# Patient Record
Sex: FEMALE | Race: BLACK OR AFRICAN AMERICAN | RURAL
[De-identification: names, ages, dates, MRNs, and addresses within clinical notes are randomized per-mention and may not be internally consistent; named-entity substitution may affect disease eponyms.]

---

## 2018-04-20 ENCOUNTER — OFFICE VISIT (OUTPATIENT)
Dept: FAMILY MEDICINE CLINIC | Age: 33
End: 2018-04-20

## 2018-04-20 VITALS
WEIGHT: 293 LBS | HEART RATE: 76 BPM | HEIGHT: 65 IN | SYSTOLIC BLOOD PRESSURE: 124 MMHG | BODY MASS INDEX: 48.82 KG/M2 | DIASTOLIC BLOOD PRESSURE: 83 MMHG | OXYGEN SATURATION: 99 % | RESPIRATION RATE: 18 BRPM | TEMPERATURE: 98.4 F

## 2018-04-20 DIAGNOSIS — E66.01 MORBID OBESITY (HCC): Primary | ICD-10-CM

## 2018-04-20 DIAGNOSIS — F41.9 ANXIETY: ICD-10-CM

## 2018-04-20 DIAGNOSIS — F32.5 MAJOR DEPRESSIVE DISORDER WITH SINGLE EPISODE, IN FULL REMISSION (HCC): ICD-10-CM

## 2018-04-20 DIAGNOSIS — Z11.3 SCREENING EXAMINATION FOR STD (SEXUALLY TRANSMITTED DISEASE): ICD-10-CM

## 2018-04-20 DIAGNOSIS — Z01.419 WELL WOMAN EXAM WITH ROUTINE GYNECOLOGICAL EXAM: ICD-10-CM

## 2018-04-20 DIAGNOSIS — I10 ESSENTIAL HYPERTENSION WITH GOAL BLOOD PRESSURE LESS THAN 140/90: ICD-10-CM

## 2018-04-20 NOTE — PROGRESS NOTES
[address] [facility],    [address] with [facility] and [employer] [facility] Insurance            HPI     CC: \" I am here for a physical \"     Umesh Rincon is a 26 y.o. female who presents  well woman visit. HTN   Initially,pt states she is compliant with all medication. However, after reviewing medication, HCTZ was last filled 2 years ago. Pt then states she has not taken any blood pressure medication in a year. She denies HA, vision changes, chest pain,abdominal pain,n/v, lower extremity swelling, HUFF. Depression/ Anxiety   Pt reports she no longer has signs and symptoms of depression. She states she had some \" moments of depression years ago\" Denies SI, HI, anhedonia,depressed mood. She states she is a different place in her life. She is dating a new glenis. She is happy. Denies alcohol abuse,tobacco abuse,or illicit drug abuse. BTL 13 years ago. Has normal monthly periods. Patient's last menstrual period was 04/20/2018. Denies family  history of uterine cancer, cervical cx, or breast cancer. Pt denies abnormal vaginal discharge, dyspareunia, dysuria. Pt has normal paps. Last pap was 2015, negative for HPV and cytology. Pt does not exercise. She states she knows she needs to do somthing about her weight. PMHx:  Past Medical History:   Diagnosis Date    Depression 12/12/2014    Essential hypertension with goal blood pressure less than 140/90 5/17/2016    Headache(784.0)     Hx of gynecological procedure 2004    BTL    Keloid 5/17/2016       Meds:   Current Outpatient Prescriptions   Medication Sig Dispense Refill    hydrochlorothiazide (HYDRODIURIL) 25 mg tablet Take 1 Tab by mouth daily.  30 Tab 1       Allergies:   No Known Allergies    Smoker:  History   Smoking Status    Former Smoker    Years: 2.00    Types: Cigarettes    Quit date: 6/1/2016   Smokeless Tobacco    Never Used       ETOH:   History   Alcohol Use No       FH:   Family History   Problem Relation Age of Onset    Asthma Mother     Hypertension Mother     Asthma Sister     Asthma Maternal Grandmother     Arthritis-osteo Paternal Grandmother     Diabetes Paternal Grandmother     Elevated Lipids Paternal Grandmother     Diabetes Other        ROS:  Review of Systems   Constitutional: Negative for activity change, appetite change, chills, fatigue and fever. Respiratory: Negative for chest tightness and shortness of breath. Cardiovascular: Negative for chest pain, palpitations and leg swelling. Gastrointestinal: Negative for abdominal pain, diarrhea, nausea and vomiting. Genitourinary: Negative for dyspareunia, dysuria, flank pain, frequency, hematuria, pelvic pain, urgency, vaginal bleeding, vaginal discharge and vaginal pain. Musculoskeletal: Negative for back pain and joint swelling. Neurological: Negative for dizziness, weakness and numbness. Psychiatric/Behavioral: Negative for confusion. Physical Exam:  Visit Vitals    /83 (BP 1 Location: Left arm, BP Patient Position: Sitting)    Pulse 76    Temp 98.4 °F (36.9 °C) (Oral)    Resp 18    Ht 5' 5\" (1.651 m)    Wt (!) 359 lb 9.6 oz (163.1 kg)    LMP 04/20/2018    SpO2 99%    BMI 59.84 kg/m2       Wt Readings from Last 3 Encounters:   04/20/18 (!) 359 lb 9.6 oz (163.1 kg)   12/15/16 343 lb 12.8 oz (155.9 kg)   12/08/16 339 lb (153.8 kg)     BP Readings from Last 3 Encounters:   04/20/18 124/83   12/15/16 144/89   12/08/16 (!) 147/92        Physical Exam   Constitutional: She is oriented to person, place, and time. She appears well-developed and well-nourished. HENT:   Head: Atraumatic. Eyes: Conjunctivae and EOM are normal.   Neck: Neck supple. Cardiovascular: Normal rate, regular rhythm, normal heart sounds and intact distal pulses. Pulmonary/Chest: Effort normal and breath sounds normal. No respiratory distress.    Abdominal: Bowel sounds are normal.   Genitourinary: Vagina normal. Cervix exhibits no motion tenderness and no friability. Right adnexum displays no mass, no tenderness and no fullness. Left adnexum displays no mass, no tenderness and no fullness. Genitourinary Comments: Physiologic discharge   Musculoskeletal: She exhibits no edema. Neurological: She is alert and oriented to person, place, and time. No cranial nerve deficit. Skin: Skin is warm. No erythema. Psychiatric: She has a normal mood and affect. Assessment     26 y.o. female presents with history of:  Patient Active Problem List   Diagnosis Code    Oj N94.0    Depression F32.9    Anxiety F41.9    Essential hypertension with goal blood pressure less than 140/90 I10    Morbid obesity ([address] E66.01    Keloid L91.0    Major depressive disorder with single episode, in full remission ([address] F32.5       Today's diagnoses are:    ICD-10-CM ICD-9-CM    1. Morbid obesity ([address] E66.01 278.01 LIPID PANEL   2. Well woman exam with routine gynecological exam Z01.419 V72.31 CHLAMYDIA/GC PCR      RPR      HIV 1/2 AG/AB, 4TH GENERATION,W RFLX CONFIRM      HEMOGLOBIN A1C WITH EAG      METABOLIC PANEL, COMPREHENSIVE      TSH 3RD GENERATION      LIPID PANEL   3. Screening examination for STD (sexually transmitted disease) Z11.3 V74.5 CT/NG/T.VAGINALIS AMPLIFICATION   4. Essential hypertension with goal blood pressure less than 140/90 I10 401.9    5. Major depressive disorder with single episode, in full remission ([address] F32.5 296.26    6. Anxiety F41.9 300.00               Plan     ·  Well Woman exam with pelvic exam   Last pap : 4/2015 negative for intraepithelial lesions or malignancy,negative HPV. Pt requested following labs as she is starting a new relationship.    - HIV, Chlamyddia, Gonorrhea, RPR,Nuswab    ·  Morbid Obesity   - I have reviewed/discussed the above normal BMI with the patient.   I have recommended the following interventions: dietary management education, guidance, and counseling, encourage exercise, monitor weight and prescribed dietary intake . Faith Cain - TSH,hemoglobin A1c, lipid panel , CMP     ·  HTN   Pt has not been compliant with bp meds. Today / 83. 2 years ago ( 2016), when patient was last seen bp 140s/80s with one elveated bp 160s/100s in 2015    - Hold HCTZ for now    - BP check in 2 weeks     · Hx of depression    PHQ=0   - Will continue to monitor for symptoms    - Never been treated with medication      Patient Care Team:  Macy Mcguire MD as PCP - General (Family Practice)  Ramandeep Faye MD (Plastic Surgery)    Follow-up Disposition:  Return in about 4 weeks (around 5/18/2018) for bp check . Prior labs and imaging were reviewed. I have discussed the diagnosis with the patient and the intended plan as seen in the above orders. The patient has received an after-visit summary and questions were answered concerning future plans. I have discussed medication side effects and warnings with the patient as well. Patient discussed with Dr. Damon Faustin, Attending Physician.     Bobby Westbrook MD    [address] [place] [address]

## 2018-04-20 NOTE — PATIENT INSTRUCTIONS

## 2018-04-20 NOTE — MR AVS SNAPSHOT
[address] [address] 
[phone] Patient: Latina Osgood MRN: [id number] NFN:[id number] Visit Information Date & Time Provider Department Dept. Phone Encounter #  
 4/20/2018  9:25 AM Rich Julio  [facility] [phone] [id number] Follow-up Instructions Return in about 4 weeks (around 5/18/2018) for bp check . Upcoming Health Maintenance Date Due DTaP/Tdap/Td series (1 - Tdap) 3/3/2004 Influenza Age 5 to Adult 8/1/2017 PAP AKA CERVICAL CYTOLOGY 4/14/2020 Allergies as of 4/20/2018  Review Complete On: 4/20/2018 By: Jaylin Rodriguez LPN No Known Allergies Current Immunizations  Never Reviewed Name Date Influenza Vaccine 10/24/2016 TB Skin Test (PPD) Intradermal 12/12/2014, 11/4/2014 Not reviewed this visit You Were Diagnosed With   
  
 Codes Comments Morbid obesity ([facility]    -  Primary ICD-10-CM: E66.01 
ICD-9-CM: 278.01 Major depressive disorder with single episode, in full remission ([facility]     ICD-10-CM: F32.5 ICD-9-CM: 296.26 Anxiety     ICD-10-CM: F41.9 ICD-9-CM: 300.00 Essential hypertension with goal blood pressure less than 140/90     ICD-10-CM: I10 
ICD-9-CM: 401.9 Well woman exam with routine gynecological exam     ICD-10-CM: M90.433 ICD-9-CM: V72.31 Vitals BP Pulse Temp Resp Height(growth percentile) Weight(growth percentile) 124/83 (BP 1 Location: Left arm, BP Patient Position: Sitting) 76 98.4 °F (36.9 °C) (Oral) 18 5' 5\" (1.651 m) (!) 359 lb 9.6 oz (163.1 kg) LMP SpO2 BMI OB Status Smoking Status 04/20/2018 99% 59.84 kg/m2 Having regular periods Former Smoker BMI and BSA Data Body Mass Index Body Surface Area  
 59.84 kg/m 2 2.73 m 2 Preferred Pharmacy Pharmacy Name Phone [address] [place], [place] - [address] [place] [address] [phone] Your Updated Medication List  
  
   
This list is accurate as of 4/20/18  9:58 AM.  Always use your most recent med list.  
  
  
  
  
 hydroCHLOROthiazide 25 mg tablet Commonly known as:  HYDRODIURIL Take 1 Tab by mouth daily. We Performed the Following CHLAMYDIA/GC PCR [00532 CPT(R)] HEMOGLOBIN A1C WITH EAG [01444 CPT(R)] HIV 1/2 AG/AB, 4TH GENERATION,W RFLX CONFIRM [id number] CPT(R)] METABOLIC PANEL, COMPREHENSIVE [49857 CPT(R)] RPR [59524 CPT(R)] TSH 3RD GENERATION [18477 CPT(R)] Follow-up Instructions Return in about 4 weeks (around 5/18/2018) for bp check . Patient Instructions Well Visit, Ages 25 to 48: Care Instructions Your Care Instructions Physical exams can help you stay healthy. Your doctor has checked your overall health and may have suggested ways to take good care of yourself. He or she also may have recommended tests. At home, you can help prevent illness with healthy eating, regular exercise, and other steps. Follow-up care is a key part of your treatment and safety. Be sure to make and go to all appointments, and call your doctor if you are having problems. It's also a good idea to know your test results and keep a list of the medicines you take. How can you care for yourself at home? · Reach and stay at a healthy weight. This will lower your risk for many problems, such as obesity, diabetes, heart disease, and high blood pressure. · Get at least 30 minutes of physical activity on most days of the week. Walking is a good choice. You also may want to do other activities, such as running, swimming, cycling, or playing tennis or team sports. Discuss any changes in your exercise program with your doctor. · Do not smoke or allow others to smoke around you. If you need help quitting, talk to your doctor about stop-smoking programs and medicines. These can increase your chances of quitting for good. · Talk to your doctor about whether you have any risk factors for sexually transmitted infections (STIs). Having one sex partner (who does not have STIs and does not have sex with anyone else) is a good way to avoid these infections. · Use birth control if you do not want to have children at this time. Talk with your doctor about the choices available and what might be best for you. · Protect your skin from too much sun. When you're outdoors from 10 a.m. to 4 p.m., stay in the shade or cover up with clothing and a hat with a wide brim. Wear sunglasses that block UV rays. Even when it's cloudy, put broad-spectrum sunscreen (SPF 30 or higher) on any exposed skin. · See a dentist one or two times a year for checkups and to have your teeth cleaned. · Wear a seat belt in the car. · Drink alcohol in moderation, if at all. That means no more than 2 drinks a day for men and 1 drink a day for women. Follow your doctor's advice about when to have certain tests. These tests can spot problems early. For everyone · Cholesterol. Have the fat (cholesterol) in your blood tested after age 21. Your doctor will tell you how often to have this done based on your age, family history, or other things that can increase your risk for heart disease. · Blood pressure. Have your blood pressure checked during a routine doctor visit. Your doctor will tell you how often to check your blood pressure based on your age, your blood pressure results, and other factors. · Vision. Talk with your doctor about how often to have a glaucoma test. 
· Diabetes. Ask your doctor whether you should have tests for diabetes. · Colon cancer. Have a test for colon cancer at age 48. You may have one of several tests. If you are younger than 48, you may need a test earlier if you have any risk factors.  Risk factors include whether you already had a precancerous polyp removed from your colon or whether your parent, brother, sister, or child has had colon cancer. For women · Breast exam and mammogram. Talk to your doctor about when you should have a clinical breast exam and a mammogram. Medical experts differ on whether and how often women under 50 should have these tests. Your doctor can help you decide what is right for you. · Pap test and pelvic exam. Begin Pap tests at age 24. A Pap test is the best way to find cervical cancer. The test often is part of a pelvic exam. Ask how often to have this test. 
· Tests for sexually transmitted infections (STIs). Ask whether you should have tests for STIs. You may be at risk if you have sex with more than one person, especially if your partners do not wear condoms. For men · Tests for sexually transmitted infections (STIs). Ask whether you should have tests for STIs. You may be at risk if you have sex with more than one person, especially if you do not wear a condom. · Testicular cancer exam. Ask your doctor whether you should check your testicles regularly. · Prostate exam. Talk to your doctor about whether you should have a blood test (called a PSA test) for prostate cancer. Experts differ on whether and when men should have this test. Some experts suggest it if you are older than 39 and are -American or have a father or brother who got prostate cancer when he was younger than 72. When should you call for help? Watch closely for changes in your health, and be sure to contact your doctor if you have any problems or symptoms that concern you. Where can you learn more? Go to http://beena-quyen.info/. Enter P072 in the search box to learn more about \"Well Visit, Ages 25 to 48: Care Instructions. \" Current as of: May 12, 2017 Content Version: 11.4 © 2795-7234 [employer].  Care instructions adapted under license by [employer] (which disclaims liability or warranty for this information). If you have questions about a medical condition or this instruction, always ask your healthcare professional. [address] any warranty or liability for your use of this information. Introducing [facility]! [facility] introduces Lua patient portal. Now you can access parts of your medical record, email your doctor's office, and request medication refills online. 1. In your internet browser, go to [email or web address] 2. Click on the First Time User? Click Here link in the Sign In box. You will see the New Member Sign Up page. 3. Enter your Lua Access Code exactly as it appears below. You will not need to use this code after youve completed the sign-up process. If you do not sign up before the expiration date, you must request a new code. · Lua Access Code: [email or web address] Expires: 7/19/2018  9:35 AM 
 
4. Enter the last four digits of your Social Security Number (xxxx) and Date of Birth (mm/dd/yyyy) as indicated and click Submit. You will be taken to the next sign-up page. 5. Create a Lua ID. This will be your Lua login ID and cannot be changed, so think of one that is secure and easy to remember. 6. Create a Lua password. You can change your password at any time. 7. Enter your Password Reset Question and Answer. This can be used at a later time if you forget your password. 8. Enter your e-mail address. You will receive e-mail notification when new information is available in [address] 9. Click Sign Up. You can now view and download portions of your medical record. 10. Click the Download Summary menu link to download a portable copy of your medical information. If you have questions, please visit the Frequently Asked Questions section of the Lua website. Remember, Lua is NOT to be used for urgent needs. For medical emergencies, dial 911. Now available from your iPhone and Android! Please provide this summary of care documentation to your next provider. Your primary care clinician is listed as Michael Velasco. If you have any questions after today's visit, please call [phone].

## 2018-04-20 NOTE — PROGRESS NOTES
Chief Complaint   Patient presents with    Well Woman    Complete Physical     1. Have you been to the ER, urgent care clinic since your last visit? Hospitalized since your last visit? No    2. Have you seen or consulted any other health care providers outside of the [facility] since your last visit? Include any pap smears or colon screening.  No

## 2018-04-21 LAB
CHOLEST SERPL-MCNC: 148 MG/DL (ref 100–199)
HDLC SERPL-MCNC: 44 MG/DL
LDLC SERPL CALC-MCNC: 84 MG/DL (ref 0–99)
TRIGL SERPL-MCNC: 100 MG/DL (ref 0–149)
VLDLC SERPL CALC-MCNC: 20 MG/DL (ref 5–40)

## 2018-04-22 LAB
C TRACH RRNA SPEC QL NAA+PROBE: NEGATIVE
N GONORRHOEA RRNA SPEC QL NAA+PROBE: NEGATIVE
T VAGINALIS RRNA SPEC QL NAA+PROBE: NEGATIVE

## 2018-04-23 LAB
ALBUMIN SERPL-MCNC: 3.8 G/DL (ref 3.5–5.5)
ALBUMIN/GLOB SERPL: 1.2 {RATIO} (ref 1.2–2.2)
ALP SERPL-CCNC: 63 IU/L (ref 39–117)
ALT SERPL-CCNC: 22 IU/L (ref 0–32)
AST SERPL-CCNC: 15 IU/L (ref 0–40)
BILIRUB SERPL-MCNC: 0.4 MG/DL (ref 0–1.2)
BUN SERPL-MCNC: 8 MG/DL (ref 6–20)
BUN/CREAT SERPL: 11 (ref 9–23)
C TRACH RRNA SPEC QL NAA+PROBE: NEGATIVE
CALCIUM SERPL-MCNC: 9.2 MG/DL (ref 8.7–10.2)
CHLORIDE SERPL-SCNC: 104 MMOL/L (ref 96–106)
CO2 SERPL-SCNC: 25 MMOL/L (ref 18–29)
CREAT SERPL-MCNC: 0.73 MG/DL (ref 0.57–1)
EST. AVERAGE GLUCOSE BLD GHB EST-MCNC: 103 MG/DL
GFR SERPLBLD CREATININE-BSD FMLA CKD-EPI: 107 ML/MIN/1.73
GFR SERPLBLD CREATININE-BSD FMLA CKD-EPI: 123 ML/MIN/1.73
GLOBULIN SER CALC-MCNC: 3.1 G/DL (ref 1.5–4.5)
GLUCOSE SERPL-MCNC: 87 MG/DL (ref 65–99)
HBA1C MFR BLD: 5.2 % (ref 4.8–5.6)
HIV 1+2 AB+HIV1 P24 AG SERPL QL IA: NON REACTIVE
N GONORRHOEA RRNA SPEC QL NAA+PROBE: NEGATIVE
POTASSIUM SERPL-SCNC: 3.9 MMOL/L (ref 3.5–5.2)
PROT SERPL-MCNC: 6.9 G/DL (ref 6–8.5)
RPR SER QL: NON REACTIVE
SODIUM SERPL-SCNC: 143 MMOL/L (ref 134–144)
TSH SERPL DL<=0.005 MIU/L-ACNC: 1.27 UIU/ML (ref 0.45–4.5)

## 2018-04-27 ENCOUNTER — TELEPHONE (OUTPATIENT)
Dept: FAMILY MEDICINE CLINIC | Age: 33
End: 2018-04-27

## 2018-04-27 NOTE — TELEPHONE ENCOUNTER
[address]     Pt was called and updated on lab results ( HIV neg, RPR neg, Hemoglobin A1c 5.2, CMP wnl, TSH normal, Lipid panel wnl, Nuswab negative.) All lab results were negative. Pt did not have any questions. She will follow up in 4 weeks for bp check.                   Bobby Westbrook MD  Family Medicine Resident  PGY 2

## 2019-03-18 ENCOUNTER — OFFICE VISIT (OUTPATIENT)
Dept: FAMILY MEDICINE CLINIC | Age: 34
End: 2019-03-18

## 2019-03-18 VITALS
RESPIRATION RATE: 20 BRPM | HEART RATE: 117 BPM | OXYGEN SATURATION: 99 % | DIASTOLIC BLOOD PRESSURE: 89 MMHG | SYSTOLIC BLOOD PRESSURE: 127 MMHG | TEMPERATURE: 100.8 F | BODY MASS INDEX: 48.82 KG/M2 | WEIGHT: 293 LBS | HEIGHT: 65 IN

## 2019-03-18 DIAGNOSIS — R50.9 ACUTE FEBRILE ILLNESS: Primary | ICD-10-CM

## 2019-03-18 DIAGNOSIS — E66.01 MORBID OBESITY (HCC): ICD-10-CM

## 2019-03-18 DIAGNOSIS — R10.30 LOWER ABDOMINAL PAIN: ICD-10-CM

## 2019-03-18 LAB
BILIRUB UR QL STRIP: NEGATIVE
GLUCOSE UR-MCNC: NEGATIVE MG/DL
HCG URINE, QL. (POC): NEGATIVE
KETONES P FAST UR STRIP-MCNC: NEGATIVE MG/DL
PH UR STRIP: 7.5 [PH] (ref 4.6–8)
PROT UR QL STRIP: NORMAL
SP GR UR STRIP: 1.01 (ref 1–1.03)
UA UROBILINOGEN AMB POC: NORMAL (ref 0.2–1)
URINALYSIS CLARITY POC: NORMAL
URINALYSIS COLOR POC: YELLOW
URINE BLOOD POC: NORMAL
URINE LEUKOCYTES POC: NORMAL
URINE NITRITES POC: NEGATIVE
VALID INTERNAL CONTROL?: YES

## 2019-03-18 RX ORDER — DICLOFENAC SODIUM 75 MG/1
75 TABLET, DELAYED RELEASE ORAL 2 TIMES DAILY
Qty: 30 TAB | Refills: 0 | Status: SHIPPED | OUTPATIENT
Start: 2019-03-18 | End: 2019-06-13 | Stop reason: SDUPTHER

## 2019-03-18 RX ORDER — CIPROFLOXACIN 500 MG/1
500 TABLET ORAL 2 TIMES DAILY
Qty: 20 TAB | Refills: 0 | Status: SHIPPED | OUTPATIENT
Start: 2019-03-18 | End: 2019-03-28

## 2019-03-18 RX ORDER — HYDROCODONE BITARTRATE AND ACETAMINOPHEN 5; 325 MG/1; MG/1
1 TABLET ORAL
Qty: 12 TAB | Refills: 0 | Status: SHIPPED | OUTPATIENT
Start: 2019-03-18 | End: 2019-03-21

## 2019-03-18 NOTE — PROGRESS NOTES
1. Have you been to the ER, urgent care clinic since your last visit? Hospitalized since your last visit? ER in [place], [place] and diagnosed with the flu    2. Have you seen or consulted any other health care providers outside of the [address] [place] [address] since your last visit? Including any pap smears or colon screening. no    Reviewed record in preparation for visit and have necessary documentation    Pt did not bring medication to office visit for review  Opportunity was given for questions    Goals that were addressed and/or need to be completed during or after this appointment include   Health Maintenance Due   Topic Date Due    DTaP/Tdap/Td series (1 - Tdap) 03/03/2004    Influenza Age 5 to Adult  08/01/2018     Body mass index is 59.57 kg/m².

## 2019-03-19 LAB
ALBUMIN SERPL-MCNC: 3.5 G/DL (ref 3.5–5.5)
ALBUMIN/GLOB SERPL: 1.1 {RATIO} (ref 1.2–2.2)
ALP SERPL-CCNC: 132 IU/L (ref 39–117)
ALT SERPL-CCNC: 30 IU/L (ref 0–32)
AST SERPL-CCNC: 21 IU/L (ref 0–40)
BASOPHILS # BLD AUTO: 0 X10E3/UL (ref 0–0.2)
BASOPHILS NFR BLD AUTO: 0 %
BILIRUB SERPL-MCNC: 0.3 MG/DL (ref 0–1.2)
BUN SERPL-MCNC: 10 MG/DL (ref 6–20)
BUN/CREAT SERPL: 13 (ref 9–23)
CALCIUM SERPL-MCNC: 8.9 MG/DL (ref 8.7–10.2)
CHLORIDE SERPL-SCNC: 100 MMOL/L (ref 96–106)
CO2 SERPL-SCNC: 24 MMOL/L (ref 20–29)
CREAT SERPL-MCNC: 0.79 MG/DL (ref 0.57–1)
EOSINOPHIL # BLD AUTO: 0 X10E3/UL (ref 0–0.4)
EOSINOPHIL NFR BLD AUTO: 0 %
ERYTHROCYTE [DISTWIDTH] IN BLOOD BY AUTOMATED COUNT: 13.1 % (ref 12.3–15.4)
GLOBULIN SER CALC-MCNC: 3.3 G/DL (ref 1.5–4.5)
GLUCOSE SERPL-MCNC: 94 MG/DL (ref 65–99)
HCT VFR BLD AUTO: 34.4 % (ref 34–46.6)
HGB BLD-MCNC: 11.5 G/DL (ref 11.1–15.9)
IMM GRANULOCYTES # BLD AUTO: 0 X10E3/UL (ref 0–0.1)
IMM GRANULOCYTES NFR BLD AUTO: 0 %
LYMPHOCYTES # BLD AUTO: 2 X10E3/UL (ref 0.7–3.1)
LYMPHOCYTES NFR BLD AUTO: 15 %
MCH RBC QN AUTO: 29.4 PG (ref 26.6–33)
MCHC RBC AUTO-ENTMCNC: 33.4 G/DL (ref 31.5–35.7)
MCV RBC AUTO: 88 FL (ref 79–97)
MONOCYTES # BLD AUTO: 1.4 X10E3/UL (ref 0.1–0.9)
MONOCYTES NFR BLD AUTO: 11 %
NEUTROPHILS # BLD AUTO: 9.9 X10E3/UL (ref 1.4–7)
NEUTROPHILS NFR BLD AUTO: 74 %
PLATELET # BLD AUTO: 559 X10E3/UL (ref 150–379)
POTASSIUM SERPL-SCNC: 4.1 MMOL/L (ref 3.5–5.2)
PROT SERPL-MCNC: 6.8 G/DL (ref 6–8.5)
RBC # BLD AUTO: 3.91 X10E6/UL (ref 3.77–5.28)
SODIUM SERPL-SCNC: 142 MMOL/L (ref 134–144)
WBC # BLD AUTO: 13.4 X10E3/UL (ref 3.4–10.8)

## 2019-03-20 ENCOUNTER — TELEPHONE (OUTPATIENT)
Dept: FAMILY MEDICINE CLINIC | Age: 34
End: 2019-03-20

## 2019-03-20 NOTE — TELEPHONE ENCOUNTER
----- Message from Meghna Mcneal MD sent at 3/20/2019 12:48 PM EDT -----  Call and check on patient. Lab results indicate infection. She was prescribed cipro.

## 2019-03-24 NOTE — PROGRESS NOTES
Patient: Santhosh Daley MRN: [id number]  SSN: [id number] YOB: 1985  Age: 37 y.o. Sex: female Subjective: Chief Complaint Patient presents with  Cold Symptoms HPI: she is a 37y.o. year old female new to me who presents for acute febrile illness with lower abdominal pain. Patient denies HA, dizziness, SOB, CP, N/V/D, melena, dysuria, acute myalgias or arthralgias. Patient with hx of HTN and morbid obesity. Encounter Diagnoses Name Primary?  Acute febrile illness Yes  Lower abdominal pain  Morbid obesity ([facility] BP Readings from Last 3 Encounters:  
03/18/19 127/89  
04/20/18 124/83  
12/15/16 144/89 Wt Readings from Last 3 Encounters:  
03/18/19 (!) 358 lb (162.4 kg) 04/20/18 (!) 359 lb 9.6 oz (163.1 kg) 12/15/16 343 lb 12.8 oz (155.9 kg) Body mass index is 59.57 kg/m². Current and past medical information: 
 
Current Medications after this visit[de-identified]    
Current Outpatient Medications Medication Sig  ciprofloxacin HCl (CIPRO) 500 mg tablet Take 1 Tab by mouth two (2) times a day for 10 days.  diclofenac EC (VOLTAREN) 75 mg EC tablet Take 1 Tab by mouth two (2) times a day. Take with food. Drink plenty of water. No current facility-administered medications for this visit. Patient Active Problem List  
 Diagnosis Date Noted  Major depressive disorder with single episode, in full remission ([facility] 04/20/2018  Essential hypertension with goal blood pressure less than 140/90 05/17/2016  Morbid obesity ([facility] 05/17/2016  Keloid 05/17/2016  Depression 12/12/2014  Anxiety 12/12/2014  [place]merz 10/30/2012 Past Medical History:  
Diagnosis Date  Depression 12/12/2014  Essential hypertension with goal blood pressure less than 140/90 5/17/2016  Headache(784.0)  Hx of gynecological procedure 2004 BTL  Keloid 5/17/2016 No Known Allergies Past Surgical History: Procedure Laterality Date  HX GYN Social History Socioeconomic History  Marital status: SINGLE Spouse name: Not on file  Number of children: Not on file  Years of education: Not on file  Highest education level: Not on file Tobacco Use  Smoking status: Former Smoker Years: 2.00 Types: Cigarettes Last attempt to quit: 2016 Years since quittin.8  Smokeless tobacco: Never Used Substance and Sexual Activity  Alcohol use: No  
 Drug use: No  
 Sexual activity: Yes  
  Partners: Male Birth control/protection: Surgical, None Comment: BTL 2004 Objective:  
 
Review of Systems: 
Constitutional: Positive for fever and malaise Derm: Negative for rash or lesion HEENT: Negative for acute hearing or vision changes Cardiovascular: Negative for dizziness, chest pain or palpitations Respiratory: Negative for cough, wheezing or SOB Gastrointestinal: c/o lower abdominal pain Genital/urinary: Negative for dysuria or voiding dysfunction Muscoloskeletal: Negative for acute myalgias or arthralgias Neurological: Negative for headache, weakness or paresthesia Psychological: hx of depression and anxiety Vitals:  
 19 1142 BP: 127/89 Pulse: (!) 117 Resp: 20 Temp: (!) 100.8 °F (38.2 °C) TempSrc: Oral  
SpO2: 99% Weight: (!) 358 lb (162.4 kg) Height: 5' 5\" (1.651 m) Body mass index is 59.57 kg/m². Physical Exam: 
Constitutional: obese, appear uncomfortable Skin: warm and dry, normal tone and turgor Head: normocephalic, atraumatic Eyes: sclera clear, EOMI, PERRL Neck: normal range of motion Cardiovascular: normal S1, S2, tachycardic, regular rhythm Respiratory: clear to auscultation bilaterally with symmetrical effort Abdomen: soft, BS normal, non-distended, diffuse lower abdominal pain Extremities: full range of motion, normal gait Neurology: normal strength and sensation Psych: active, alert and oriented, affect appropriate Assessment and orders: ICD-10-CM ICD-9-CM 1. Acute febrile illness R50.9 780.60 CBC WITH AUTOMATED DIFF  
   ciprofloxacin HCl (CIPRO) 500 mg tablet AMB POC URINE PREGNANCY TEST, VISUAL COLOR COMPARISON 2. Lower abdominal pain R10.30 789.09 AMB POC URINALYSIS DIP STICK AUTO W/O MICRO  
   CBC WITH AUTOMATED DIFF  
   METABOLIC PANEL, COMPREHENSIVE  
   ciprofloxacin HCl (CIPRO) 500 mg tablet XR ABD ACUTE W 1 V CHEST  
   diclofenac EC (VOLTAREN) 75 mg EC tablet HYDROcodone-acetaminophen (NORCO) 5-325 mg per tablet AMB POC URINE PREGNANCY TEST, VISUAL COLOR COMPARISON 3. Morbid obesity ([facility] E66.01 278.01 Plan of care: 
Diagnoses were discussed in detail with patient. Medication risks/benefits/side effects discussed with patient. All of the patient's questions were addressed and answered to apparent satisfaction. The patient understands and agrees with our plan of care. The patient knows to call back if they have questions about the plan of care or if symptoms change. The patient received an After-Visit Summary which contains VS, diagnoses, orders, allergy and medication lists. Patient Care Team: 
Shelton Shepherd MD as PCP - [facility]) Anju Mcmahon MD (Plastic Surgery) Follow-up and Dispositions · Return in about 1 week (around 3/25/2019), or if symptoms worsen or fail to improve. No future appointments. Signed By: Nicolás Benson MD   
 March 24, 2019

## 2019-03-24 NOTE — PATIENT INSTRUCTIONS

## 2019-06-13 ENCOUNTER — OFFICE VISIT (OUTPATIENT)
Dept: FAMILY MEDICINE CLINIC | Age: 34
End: 2019-06-13

## 2019-06-13 VITALS
HEART RATE: 88 BPM | RESPIRATION RATE: 18 BRPM | BODY MASS INDEX: 48.82 KG/M2 | TEMPERATURE: 97.4 F | WEIGHT: 293 LBS | SYSTOLIC BLOOD PRESSURE: 149 MMHG | DIASTOLIC BLOOD PRESSURE: 99 MMHG | HEIGHT: 65 IN | OXYGEN SATURATION: 97 %

## 2019-06-13 DIAGNOSIS — I10 ESSENTIAL HYPERTENSION WITH GOAL BLOOD PRESSURE LESS THAN 140/90: ICD-10-CM

## 2019-06-13 DIAGNOSIS — R10.30 LOWER ABDOMINAL PAIN: ICD-10-CM

## 2019-06-13 DIAGNOSIS — L91.0 KELOID OF SKIN: ICD-10-CM

## 2019-06-13 DIAGNOSIS — M25.562 CHRONIC PAIN OF BOTH KNEES: ICD-10-CM

## 2019-06-13 DIAGNOSIS — M25.561 CHRONIC PAIN OF BOTH KNEES: ICD-10-CM

## 2019-06-13 DIAGNOSIS — G89.29 CHRONIC PAIN OF BOTH KNEES: ICD-10-CM

## 2019-06-13 RX ORDER — DICLOFENAC SODIUM 75 MG/1
75 TABLET, DELAYED RELEASE ORAL 2 TIMES DAILY
Qty: 60 TAB | Refills: 1 | Status: SHIPPED | OUTPATIENT
Start: 2019-06-13 | End: 2019-07-15 | Stop reason: SDUPTHER

## 2019-06-13 RX ORDER — HYDROCHLOROTHIAZIDE 25 MG/1
25 TABLET ORAL DAILY
Qty: 90 TAB | Refills: 0 | Status: SHIPPED | OUTPATIENT
Start: 2019-06-13 | End: 2019-11-15 | Stop reason: SDUPTHER

## 2019-06-13 NOTE — PROGRESS NOTES
Chief Complaint   Patient presents with    Keloid     back of both ears    Knee Pain     both     Visit Vitals  BP (!) 145/101 (BP 1 Location: Right arm, BP Patient Position: Sitting)   Pulse 88   Temp 97.4 °F (36.3 °C) (Oral)   Resp 18   Ht 5' 5\" (1.651 m)   Wt (!) 374 lb (169.6 kg)   LMP 06/10/2019 (Approximate)   SpO2 97%   BMI 62.24 kg/m²     1. Have you been to the ER, urgent care clinic since your last visit? Hospitalized since your last visit? No    2. Have you seen or consulted any other health care providers outside of the [address] [place] [address] since your last visit? Include any pap smears or colon screening.  No    Reviewed record in preparation for visit and have necessary documentation  Pt did not bring medication to office visit for review  opportunity was given for questions  Goals that were addressed and/or need to be completed during or after this appointment include   Health Maintenance Due   Topic Date Due    DTaP/Tdap/Td series (1 - Tdap) 03/03/2004

## 2019-06-13 NOTE — PATIENT INSTRUCTIONS
High Blood Pressure: Care Instructions  Overview    It's normal for blood pressure to go up and down throughout the day. But if it stays up, you have high blood pressure. Another name for high blood pressure is hypertension. Despite what a lot of people think, high blood pressure usually doesn't cause headaches or make you feel dizzy or lightheaded. It usually has no symptoms. But it does increase your risk of stroke, heart attack, and other problems. You and your doctor will talk about your risks of these problems based on your blood pressure. Your doctor will give you a goal for your blood pressure. Your goal will be based on your health and your age. Lifestyle changes, such as eating healthy and being active, are always important to help lower blood pressure. You might also take medicine to reach your blood pressure goal.  Follow-up care is a key part of your treatment and safety. Be sure to make and go to all appointments, and call your doctor if you are having problems. It's also a good idea to know your test results and keep a list of the medicines you take. How can you care for yourself at home? Medical treatment  · If you stop taking your medicine, your blood pressure will go back up. You may take one or more types of medicine to lower your blood pressure. Be safe with medicines. Take your medicine exactly as prescribed. Call your doctor if you think you are having a problem with your medicine. · Talk to your doctor before you start taking aspirin every day. Aspirin can help certain people lower their risk of a heart attack or stroke. But taking aspirin isn't right for everyone, because it can cause serious bleeding. · See your doctor regularly. You may need to see the doctor more often at first or until your blood pressure comes down. · If you are taking blood pressure medicine, talk to your doctor before you take decongestants or anti-inflammatory medicine, such as ibuprofen.  Some of these medicines can raise blood pressure. · Learn how to check your blood pressure at home. Lifestyle changes  · Stay at a healthy weight. This is especially important if you put on weight around the waist. Losing even 10 pounds can help you lower your blood pressure. · If your doctor recommends it, get more exercise. Walking is a good choice. Bit by bit, increase the amount you walk every day. Try for at least 30 minutes on most days of the week. You also may want to swim, bike, or do other activities. · Avoid or limit alcohol. Talk to your doctor about whether you can drink any alcohol. · Try to limit how much sodium you eat to less than 2,300 milligrams (mg) a day. Your doctor may ask you to try to eat less than 1,500 mg a day. · Eat plenty of fruits (such as bananas and oranges), vegetables, legumes, whole grains, and low-fat dairy products. · Lower the amount of saturated fat in your diet. Saturated fat is found in animal products such as milk, cheese, and meat. Limiting these foods may help you lose weight and also lower your risk for heart disease. · Do not smoke. Smoking increases your risk for heart attack and stroke. If you need help quitting, talk to your doctor about stop-smoking programs and medicines. These can increase your chances of quitting for good. When should you call for help? Call 911 anytime you think you may need emergency care. This may mean having symptoms that suggest that your blood pressure is causing a serious heart or blood vessel problem. Your blood pressure may be over 180/120.   For example, call 911 if:    · You have symptoms of a heart attack. These may include:  ? Chest pain or pressure, or a strange feeling in the chest.  ? Sweating. ? Shortness of breath. ? Nausea or vomiting. ? Pain, pressure, or a strange feeling in the back, neck, jaw, or upper belly or in one or both shoulders or arms. ? Lightheadedness or sudden weakness.   ? A fast or irregular heartbeat.     · You have symptoms of a stroke. These may include:  ? Sudden numbness, tingling, weakness, or loss of movement in your face, arm, or leg, especially on only one side of your body. ? Sudden vision changes. ? Sudden trouble speaking. ? Sudden confusion or trouble understanding simple statements. ? Sudden problems with walking or balance. ? A sudden, severe headache that is different from past headaches.     · You have severe back or belly pain.    Do not wait until your blood pressure comes down on its own. Get help right away.   Call your doctor now or seek immediate care if:    · Your blood pressure is much higher than normal (such as 180/120 or higher), but you don't have symptoms.     · You think high blood pressure is causing symptoms, such as:  ? Severe headache.  ? Blurry vision.    Watch closely for changes in your health, and be sure to contact your doctor if:    · Your blood pressure measures higher than your doctor recommends at least 2 times. That means the top number is higher or the bottom number is higher, or both.     · You think you may be having side effects from your blood pressure medicine. Where can you learn more? Go to http://beena-quyen.info/. Enter Q397 in the search box to learn more about \"High Blood Pressure: Care Instructions. \"  Current as of: July 22, 2018  Content Version: 11.9  © 8551-5445 [employer]. Care instructions adapted under license by [employer] (which disclaims liability or warranty for this information). If you have questions about a medical condition or this instruction, always ask your healthcare professional. [place] [address] any warranty or liability for your use of this information. Weight Loss Diet Guidelines      Eat ONLY when you are hungry, and stop just before you are full. If you are eating enough fat in your meals, you will feel full for 5-6 hours after, and you can avoid snacks.  This is your goal.     Eat More of these Foods:    Meat: Beef, lamb, pork, chicken and others  Fish: Any kind of fish  Eggs: As many as you want, with the yolk  Vegetables: ANY vegetables but limit starchy vegetables like potatoes, corn, carrots or peas  Nuts and Seeds: No more than one handful a day  High-fat Dairy: Cheese, butter, heavy cream      Eat Less of this, but OK Rarely:    Fruits: Berries and Melon are best. May have one handful per day. Limit other fruits, particularly bananas, grapes, mangos, and pineapple  DO NOT DRINK ANY JUICE, EVER. Whole grains: Oatmeal, quinoa, brown rice  Legumes: Beans, lentils      Do Not Eat these Foods:    Drinks with calories: Sodas, fruit juices, sweet tea, sports drinks (gatorade, etc)  Sugar: Honey, agave, candy, cake, cookies, ice cream  Grains: Bread, pasta, crackers, cereal, chips  Yogurt: Most yogurt is as bad as candy. Eat only high-fat, plain yogurt, like Fage 2% plain. Trans Fats: \"Hydrogenated\" or \"partially hydrogenated\" oils, margarine  \"Diet\" and \"Low fat\" Products  Highly processed foods. If it looks like it was made in a factory, don't eat it. If it has more than 5 ingredients, it is processed. What Should Meals Look Like? Breakfast: Joelene Kitten, eggs, sausage or plain yogurt with berries  Lunch: Big salad with meat, cheese, avocado, homemade dressing or olive oil and vinegar. Or meat, chicken, fish and vegetables. Dinner: Meat, chicken or fish and vegetables, or salad, like above  Snack: Berries, cheese, nuts  Drinks:Plenty of water.  May also have coffee, tea, or artificially sweetened beverages (but not too many)

## 2019-06-13 NOTE — PROGRESS NOTES
Hernan Muse  37 y.o. female  1985  [address] [address]  [id number]     [facility] Practice: Progress Note       Encounter Date: 6/13/2019    Chief Complaint   Patient presents with    Keloid     back of both ears    Knee Pain     both     History of Present Illness   Hernan Muse is a 37 y.o. female who presents to clinic today for:    Keloid- noted keloid on both ear lobes. Denies s/s of infection. Both ears still pierced. States pain if she hits the lesions. Cosmetic concerns. Had left ear posterior keloid removed ? 2016 but now has an left ear anterior lesion ; ok with going back to plastics. HTN-doesn't check BP at home. Doesn't add salt to foods. Some fast food and fried foods. States her knees hurt which has delayed her working out and exercising. LE edema noted; works from home and denies getting up and moving hourly or elevating her legs. Last eye exam-ok 06/05/2019. States she does drink water and sodas. Was prescribed HCTZ in 2016 but was not compliant due to h/a. Denies-CP, SOB, h/a, vision changes, TIA. Health Maintenance    Health Maintenance Due   Topic Date Due    DTaP/Tdap/Td series (1 - Tdap) 03/03/2004     Review of Systems   Review of Systems   Constitutional: Negative. HENT: Negative. Eyes: Negative. Respiratory: Negative. Cardiovascular: Negative. Gastrointestinal: Negative. Genitourinary: Negative. Musculoskeletal: Positive for joint pain. Skin: Negative. Neurological: Negative. Endo/Heme/Allergies: Negative. Psychiatric/Behavioral: Negative. Vitals/Objective:     Vitals:    06/13/19 0929 06/13/19 0952   BP: (!) 145/101 (!) 149/99   Pulse: 88    Resp: 18    Temp: 97.4 °F (36.3 °C)    TempSrc: Oral    SpO2: 97%    Weight: (!) 374 lb (169.6 kg)    Height: 5' 5\" (1.651 m)      Body mass index is 62.24 kg/m². Physical Exam   Constitutional: She is active and cooperative.    HENT:   Ears:    Cardiovascular: Normal rate, regular rhythm, normal heart sounds and normal pulses. Pulmonary/Chest: Effort normal and breath sounds normal.   Neurological: She is alert. Skin: Skin is warm, dry and intact. Psychiatric: She has a normal mood and affect. Her speech is normal and behavior is normal. Judgment and thought content normal. Cognition and memory are normal.       No results found for this or any previous visit (from the past 24 hour(s)). Assessment and Plan:     HTN-reordered HCTZ. Reviewed 03/2019 labs-normal electrolytes. Keloid-referred to plastics. Chronic bilateral knee pain-refilled diclofenac. BMI 60-printed weight loss diet guidelines. Advised patient to f/u with residents for weight loss clinic. I have discussed the diagnosis with the patient and the intended plan as seen in the above orders. she has expressed understanding. The patient has received an after-visit summary and questions were answered concerning future plans. I have discussed medication side effects and warnings with the patient as well. Electronically Signed: Scott Caban NP     History/Allergies   Patients past medical, surgical and family histories were reviewed and updated.     Past Medical History:   Diagnosis Date    Depression 12/12/2014    Essential hypertension with goal blood pressure less than 140/90 5/17/2016    Headache(784.0)     Hx of gynecological procedure 2004    BTL    Keloid 5/17/2016      Past Surgical History:   Procedure Laterality Date    HX GYN       Family History   Problem Relation Age of Onset    Asthma Mother     Hypertension Mother     Asthma Sister     Asthma Maternal Grandmother     Arthritis-osteo Paternal Grandmother     Diabetes Paternal Grandmother     Elevated Lipids Paternal Grandmother     Diabetes Other      Social History     Socioeconomic History    Marital status: SINGLE     Spouse name: Not on file    Number of children: Not on file    Years of education: Not on file    Highest education level: Not on file   Occupational History    Not on file   Social Needs    Financial resource strain: Not on file    Food insecurity:     Worry: Not on file     Inability: Not on file    Transportation needs:     Medical: Not on file     Non-medical: Not on file   Tobacco Use    Smoking status: Former Smoker     Years: 2.00     Types: Cigarettes     Last attempt to quit: 6/1/2016     Years since quitting: 3.0    Smokeless tobacco: Never Used   Substance and Sexual Activity    Alcohol use: No    Drug use: No    Sexual activity: Yes     Partners: Male     Birth control/protection: Surgical, None     Comment: YVETTE 2004   Lifestyle    Physical activity:     Days per week: Not on file     Minutes per session: Not on file    Stress: Not on file   Relationships    Social connections:     Talks on phone: Not on file     Gets together: Not on file     Attends Synagogue service: Not on file     Active member of club or organization: Not on file     Attends meetings of clubs or organizations: Not on file     Relationship status: Not on file    Intimate partner violence:     Fear of current or ex partner: Not on file     Emotionally abused: Not on file     Physically abused: Not on file     Forced sexual activity: Not on file   Other Topics Concern    Not on file   Social History Narrative    Not on file         No Known Allergies    Disposition     Follow-up and Dispositions  ·   Return in about 1 month (around 7/13/2019) for weight loss clinic with the resident. .         No future appointments. Current Medications after this visit     Current Outpatient Medications   Medication Sig    diclofenac EC (VOLTAREN) 75 mg EC tablet Take 1 Tab by mouth two (2) times a day. Take with food. Drink plenty of water.  hydroCHLOROthiazide (HYDRODIURIL) 25 mg tablet Take 1 Tab by mouth daily. No current facility-administered medications for this visit.       Medications Discontinued During This Encounter   Medication Reason    diclofenac EC (VOLTAREN) 75 mg EC tablet Reorder

## 2019-07-15 DIAGNOSIS — R10.30 LOWER ABDOMINAL PAIN: ICD-10-CM

## 2019-07-15 RX ORDER — DICLOFENAC SODIUM 75 MG/1
TABLET, DELAYED RELEASE ORAL
Qty: 60 TAB | Refills: 0 | Status: SHIPPED | OUTPATIENT
Start: 2019-07-15 | End: 2019-09-09 | Stop reason: SDUPTHER

## 2019-08-05 ENCOUNTER — HOSPITAL ENCOUNTER (OUTPATIENT)
Dept: RADIATION THERAPY | Age: 34
Discharge: HOME | End: 2019-08-05

## 2019-09-09 DIAGNOSIS — R10.30 LOWER ABDOMINAL PAIN: ICD-10-CM

## 2019-09-09 RX ORDER — DICLOFENAC SODIUM 75 MG/1
TABLET, DELAYED RELEASE ORAL
Qty: 60 TAB | Refills: 0 | Status: SHIPPED | OUTPATIENT
Start: 2019-09-09 | End: 2019-11-15 | Stop reason: SDUPTHER

## 2019-09-09 NOTE — TELEPHONE ENCOUNTER
Refill Encounter        Caller's first/last name:  Noni Dalal      Relationship to patient and are they on HIPAA:  Self      Name and dosage of medication:  Diclofenac 75 mg      Is patient out of medication? Yes      Name of Pharmacy:  [facility]      Did you check for a duplicate Encounter?   Yes      Best contact number:  [phone]      Further clarification of call:

## 2019-09-23 ENCOUNTER — TELEPHONE (OUTPATIENT)
Dept: FAMILY MEDICINE CLINIC | Age: 34
End: 2019-09-23

## 2019-09-23 NOTE — TELEPHONE ENCOUNTER
----- Message from Chloé Mccord sent at 9/23/2019  7:15 AM EDT -----  Regarding: FELICIA Brandt Message/Vendor Calls    Caller's first and last name:((patient))    Reason for call sore throat need appt     Callback required yes/no and why: Yes, to have a appt     Best contact number(s): [phone]    Details to clarify the request: Pt would like a call back to be fit in today for sore throat(hoarse ). Can barely speak.        Chloé Mccord

## 2019-09-23 NOTE — TELEPHONE ENCOUNTER
Returned call to pt. Pt states her throat hurts and she is congested. Appt offered for tomorrow. Pt states she will call us back to schedule.

## 2019-09-25 ENCOUNTER — OFFICE VISIT (OUTPATIENT)
Dept: FAMILY MEDICINE CLINIC | Age: 34
End: 2019-09-25

## 2019-09-25 VITALS
SYSTOLIC BLOOD PRESSURE: 134 MMHG | TEMPERATURE: 98.3 F | HEIGHT: 65 IN | DIASTOLIC BLOOD PRESSURE: 88 MMHG | OXYGEN SATURATION: 97 % | HEART RATE: 96 BPM | WEIGHT: 293 LBS | RESPIRATION RATE: 20 BRPM | BODY MASS INDEX: 48.82 KG/M2

## 2019-09-25 DIAGNOSIS — J06.9 UPPER RESPIRATORY TRACT INFECTION, UNSPECIFIED TYPE: ICD-10-CM

## 2019-09-25 DIAGNOSIS — J34.89 FRONTAL SINUS PAIN: ICD-10-CM

## 2019-09-25 DIAGNOSIS — J02.9 SORE THROAT: Primary | ICD-10-CM

## 2019-09-25 LAB
S PYO AG THROAT QL: NEGATIVE
VALID INTERNAL CONTROL?: YES

## 2019-09-25 RX ORDER — AMOXICILLIN AND CLAVULANATE POTASSIUM 875; 125 MG/1; MG/1
1 TABLET, FILM COATED ORAL 2 TIMES DAILY
Qty: 10 TAB | Refills: 0 | Status: SHIPPED | OUTPATIENT
Start: 2019-09-25 | End: 2019-11-15 | Stop reason: SDUPTHER

## 2019-09-25 NOTE — PATIENT INSTRUCTIONS

## 2019-09-25 NOTE — LETTER
NOTIFICATION RETURN TO WORK / SCHOOL 
 
9/25/2019 10:56 AM 
 
Ms. Bong Mistry [address] [facility] To Whom It May Concern: 
 
Bong Mistry is currently under the care of Shai Espinoza. She will return to work/school on: 09/26/2019. If there are questions or concerns please have the patient contact our office. Sincerely, Priya Marks NP

## 2019-09-25 NOTE — PROGRESS NOTES
Army Odom  37 y.o. female  1985  [address] [address]  [id number]     [facility] Practice: Progress Note       Encounter Date: 9/25/2019    Chief Complaint   Patient presents with    Cough     with congestion    Sore Throat     History of Present Illness   Army Odom is a 37 y.o. female who presents to clinic today for:    Cough- ~5 days-sore throat/hoarseness, coughing (yellow/green phlegm), left side h/a (pain and pressure) sneezing. Denies-wheezing, fever, rash, abdominal pain. decreased appetite but tolerating fluids. Sick contact from her son. Works in a [employer] but no known sick contacts at work. Wearing sunglasses in clinic due to the h/a and sinus pain. Health Maintenance    Health Maintenance Due   Topic Date Due    DTaP/Tdap/Td series (1 - Tdap) 03/03/2004    Influenza Age 5 to Adult  08/01/2019     Review of Systems   Review of Systems   Constitutional: Negative. HENT: Positive for sinus pain and sore throat. Eyes: Negative. Respiratory: Positive for cough. Cardiovascular: Negative. Gastrointestinal: Negative. Genitourinary: Negative. Musculoskeletal: Negative. Skin: Negative. Neurological: Negative. Endo/Heme/Allergies: Negative. Psychiatric/Behavioral: Negative. Vitals/Objective:     Vitals:    09/25/19 1040   BP: 134/88   Pulse: 96   Resp: 20   Temp: 98.3 °F (36.8 °C)   TempSrc: Oral   SpO2: 97%   Weight: (!) 369 lb 9.6 oz (167.6 kg)   Height: 5' 5\" (1.651 m)     Body mass index is 61.5 kg/m². Physical Exam   Constitutional: She is oriented to person, place, and time. She is cooperative. HENT:   Nose: Left sinus exhibits frontal sinus tenderness. Mouth/Throat: Uvula is midline, oropharynx is clear and moist and mucous membranes are normal.   Eyes: Pupils are equal, round, and reactive to light.  Conjunctivae and lids are normal.   Neck: Trachea normal, normal range of motion, full passive range of motion without pain and phonation normal. Neck supple. Cardiovascular: Normal rate, regular rhythm, normal heart sounds and normal pulses. Pulmonary/Chest: Effort normal and breath sounds normal.   Musculoskeletal: Normal range of motion. Lymphadenopathy:     She has no cervical adenopathy. Neurological: She is alert and oriented to person, place, and time. Skin: Skin is warm, dry and intact. Psychiatric: She has a normal mood and affect. Her speech is normal and behavior is normal. Judgment and thought content normal. Cognition and memory are normal.       Recent Results (from the past 24 hour(s))   AMB POC RAPID STREP A    Collection Time: 09/25/19 10:48 AM   Result Value Ref Range    VALID INTERNAL CONTROL POC Yes     Group A Strep Ag Negative Negative     Assessment and Plan:   1. Sore throat  - AMB POC RAPID STREP A    2. Upper respiratory tract infection, unspecified type    Will cover patient with augmentin-allergies reviewed. Discussed supportive therapy and info via AVS. Work note provided. Mucinex DM twice a day for the next 7 days. Fluids. Tylenol/ibuprofen. Gargle with warm salt water 4 times a day. Change your toothbrush. I have discussed the diagnosis with the patient and the intended plan as seen in the above orders. she has expressed understanding. The patient has received an after-visit summary and questions were answered concerning future plans. I have discussed medication side effects and warnings with the patient as well. Electronically Signed: Carina Ozuna NP     History/Allergies   Patients past medical, surgical and family histories were reviewed and updated.     Past Medical History:   Diagnosis Date    Depression 12/12/2014    Essential hypertension with goal blood pressure less than 140/90 5/17/2016    Headache(784.0)     Hx of gynecological procedure 2004    BTL    Keloid 5/17/2016      Past Surgical History:   Procedure Laterality Date    HX GYN Family History   Problem Relation Age of Onset    Asthma Mother     Hypertension Mother     Asthma Sister     Asthma Maternal Grandmother     Arthritis-osteo Paternal Grandmother     Diabetes Paternal Grandmother     Elevated Lipids Paternal Grandmother     Diabetes Other      Social History     Socioeconomic History    Marital status: SINGLE     Spouse name: Not on file    Number of children: Not on file    Years of education: Not on file    Highest education level: Not on file   Occupational History    Not on file   Social Needs    Financial resource strain: Not on file    Food insecurity:     Worry: Not on file     Inability: Not on file    Transportation needs:     Medical: Not on file     Non-medical: Not on file   Tobacco Use    Smoking status: Former Smoker     Years: 2.00     Types: Cigarettes     Last attempt to quit: 6/1/2016     Years since quitting: 3.3    Smokeless tobacco: Never Used   Substance and Sexual Activity    Alcohol use: No    Drug use: No    Sexual activity: Yes     Partners: Male     Birth control/protection: Surgical, None     Comment: BTL 2004   Lifestyle    Physical activity:     Days per week: Not on file     Minutes per session: Not on file    Stress: Not on file   Relationships    Social connections:     Talks on phone: Not on file     Gets together: Not on file     Attends Buddhist service: Not on file     Active member of club or organization: Not on file     Attends meetings of clubs or organizations: Not on file     Relationship status: Not on file    Intimate partner violence:     Fear of current or ex partner: Not on file     Emotionally abused: Not on file     Physically abused: Not on file     Forced sexual activity: Not on file   Other Topics Concern    Not on file   Social History Narrative    Not on file         No Known Allergies    Disposition     Follow-up and Dispositions  ·   Return if symptoms worsen or fail to improve.          No future appointments. Current Medications after this visit     Current Outpatient Medications   Medication Sig    amoxicillin-clavulanate (AUGMENTIN) 875-125 mg per tablet Take 1 Tab by mouth two (2) times a day for 5 days. Indications: Acute Infection of the Nose, Throat or Sinus    diclofenac EC (VOLTAREN) 75 mg EC tablet TAKE ONE TABLET BY MOUTH TWICE DAILY WITH FOOD *DRINK PLENTY OF WATER*    hydroCHLOROthiazide (HYDRODIURIL) 25 mg tablet Take 1 Tab by mouth daily. No current facility-administered medications for this visit. There are no discontinued medications.

## 2019-09-25 NOTE — PROGRESS NOTES
Chief Complaint   Patient presents with    Cough     with congestion    Sore Throat     Visit Vitals  /88 (BP 1 Location: Right arm, BP Patient Position: Sitting)   Pulse 96   Temp 98.3 °F (36.8 °C) (Oral)   Resp 20   Ht 5' 5\" (1.651 m)   Wt (!) 369 lb 9.6 oz (167.6 kg)   SpO2 97%   BMI 61.50 kg/m²     1. Have you been to the ER, urgent care clinic since your last visit? Hospitalized since your last visit? No    2. Have you seen or consulted any other health care providers outside of the [address] [place] [address] since your last visit? Include any pap smears or colon screening.  No    Reviewed record in preparation for visit and have necessary documentation  Pt did not bring medication to office visit for review  opportunity was given for questions  Goals that were addressed and/or need to be completed during or after this appointment include   Health Maintenance Due   Topic Date Due    DTaP/Tdap/Td series (1 - Tdap) 03/03/2004    Influenza Age 5 to Adult  08/01/2019

## 2019-10-14 ENCOUNTER — HOSPITAL ENCOUNTER (OUTPATIENT)
Dept: LAB | Age: 34
Discharge: HOME | End: 2019-10-14

## 2019-11-15 ENCOUNTER — OFFICE VISIT (OUTPATIENT)
Dept: FAMILY MEDICINE CLINIC | Age: 34
End: 2019-11-15

## 2019-11-15 VITALS
TEMPERATURE: 97.7 F | RESPIRATION RATE: 20 BRPM | WEIGHT: 293 LBS | HEART RATE: 98 BPM | OXYGEN SATURATION: 97 % | BODY MASS INDEX: 48.82 KG/M2 | DIASTOLIC BLOOD PRESSURE: 89 MMHG | HEIGHT: 65 IN | SYSTOLIC BLOOD PRESSURE: 142 MMHG

## 2019-11-15 DIAGNOSIS — R10.30 LOWER ABDOMINAL PAIN: ICD-10-CM

## 2019-11-15 DIAGNOSIS — J02.9 SORE THROAT: ICD-10-CM

## 2019-11-15 DIAGNOSIS — J06.9 UPPER RESPIRATORY TRACT INFECTION, UNSPECIFIED TYPE: Primary | ICD-10-CM

## 2019-11-15 DIAGNOSIS — I10 ESSENTIAL HYPERTENSION WITH GOAL BLOOD PRESSURE LESS THAN 140/90: ICD-10-CM

## 2019-11-15 LAB
S PYO AG THROAT QL: NEGATIVE
VALID INTERNAL CONTROL?: YES

## 2019-11-15 RX ORDER — AMOXICILLIN AND CLAVULANATE POTASSIUM 875; 125 MG/1; MG/1
1 TABLET, FILM COATED ORAL 2 TIMES DAILY
Qty: 10 TAB | Refills: 0 | Status: SHIPPED | OUTPATIENT
Start: 2019-11-15 | End: 2019-11-18 | Stop reason: ALTCHOICE

## 2019-11-15 RX ORDER — DICLOFENAC SODIUM 75 MG/1
TABLET, DELAYED RELEASE ORAL
Qty: 60 TAB | Refills: 0 | Status: SHIPPED | OUTPATIENT
Start: 2019-11-15 | End: 2020-03-10 | Stop reason: SDUPTHER

## 2019-11-15 RX ORDER — HYDROCHLOROTHIAZIDE 25 MG/1
25 TABLET ORAL DAILY
Qty: 90 TAB | Refills: 0 | Status: SHIPPED | OUTPATIENT
Start: 2019-11-15 | End: 2020-03-10 | Stop reason: SDUPTHER

## 2019-11-15 NOTE — PROGRESS NOTES
Chief Complaint   Patient presents with    Sore Throat     with nasal congestion and cold chills     Visit Vitals  /89 (BP 1 Location: Right arm, BP Patient Position: Sitting)   Pulse 98   Temp 97.7 °F (36.5 °C) (Oral)   Resp 20   Ht 5' 5\" (1.651 m)   Wt (!) 375 lb 3.2 oz (170.2 kg)   SpO2 97%   BMI 62.44 kg/m²     1. Have you been to the ER, urgent care clinic since your last visit? Hospitalized since your last visit? No    2. Have you seen or consulted any other health care providers outside of the [address] [place] [address] since your last visit? Include any pap smears or colon screening.  No    Reviewed record in preparation for visit and have necessary documentation  Pt did not bring medication to office visit for review  opportunity was given for questions  Goals that were addressed and/or need to be completed during or after this appointment include   Health Maintenance Due   Topic Date Due    DTaP/Tdap/Td series (1 - Tdap) 03/03/2004    Influenza Age 5 to Adult  08/01/2019

## 2019-11-15 NOTE — LETTER
NOTIFICATION RETURN TO WORK / SCHOOL 
 
11/15/2019 3:39 PM 
 
Ms. Fatemeh Villela [address] [facility] To Whom It May Concern: 
 
Fatemeh Villela is currently under the care of Shai Espinoza. She will return to work/school on: 11/18/2019. If there are questions or concerns please have the patient contact our office. Sincerely, Amna Renteria NP

## 2019-11-15 NOTE — PATIENT INSTRUCTIONS

## 2019-11-15 NOTE — PROGRESS NOTES
Filomena Cabrera  37 y.o. female  1985  [address] [address]  [id number]     [facility] Practice: Progress Note       Encounter Date: 11/15/2019    Chief Complaint   Patient presents with    Sore Throat     with nasal congestion and cold chills     History of Present Illness   Filomena Cabrera is a 37 y.o. female who presents to clinic today for:    Cold symptoms- ~1 week of non productive cough, sneezing, h/a, sore throat, congestion, and cold chills. No known sick contact exposure. Treatments-OTC thera flu. Tolerating food and fluids. Health Maintenance    Health Maintenance Due   Topic Date Due    DTaP/Tdap/Td series (1 - Tdap) 03/03/2004    Influenza Age 5 to Adult  08/01/2019     Review of Systems   Review of Systems   Constitutional: Positive for chills. HENT: Positive for congestion and sore throat. Eyes: Negative. Respiratory: Positive for cough. Cardiovascular: Negative. Gastrointestinal: Negative. Genitourinary: Negative. Musculoskeletal: Negative. Skin: Negative. Neurological: Positive for headaches. Endo/Heme/Allergies: Negative. Psychiatric/Behavioral: Negative. Vitals/Objective:     Vitals:    11/15/19 1518   BP: 142/89   Pulse: 98   Resp: 20   Temp: 97.7 °F (36.5 °C)   TempSrc: Oral   SpO2: 97%   Weight: (!) 375 lb 3.2 oz (170.2 kg)   Height: 5' 5\" (1.651 m)     Body mass index is 62.44 kg/m². Physical Exam   Constitutional: She is oriented to person, place, and time. She is cooperative. HENT:   Nose: Nose normal.   Mouth/Throat: Uvula is midline, oropharynx is clear and moist and mucous membranes are normal.   Eyes: Conjunctivae and lids are normal.   Neck: Trachea normal, normal range of motion, full passive range of motion without pain and phonation normal. Neck supple. No thyroid mass and no thyromegaly present. Cardiovascular: Normal rate, regular rhythm, normal heart sounds and normal pulses.    Pulmonary/Chest: Effort normal and breath sounds normal.   Lymphadenopathy:     She has no cervical adenopathy. Neurological: She is alert and oriented to person, place, and time. She has normal strength. She displays a negative Romberg sign. Skin: Skin is warm, dry and intact. Psychiatric: She has a normal mood and affect. Her speech is normal and behavior is normal. Judgment and thought content normal. Cognition and memory are normal.       Recent Results (from the past 24 hour(s))   AMB POC RAPID STREP A    Collection Time: 11/15/19  3:30 PM   Result Value Ref Range    VALID INTERNAL CONTROL POC Yes     Group A Strep Ag Negative Negative     Assessment and Plan:   1. Essential hypertension with goal blood pressure less than 140/90    - hydroCHLOROthiazide (HYDRODIURIL) 25 mg tablet; Take 1 Tab by mouth daily. Dispense: 90 Tab; Refill: 0    2. Lower abdominal pain    - diclofenac EC (VOLTAREN) 75 mg EC tablet; TAKE ONE TABLET BY MOUTH TWICE DAILY WITH FOOD *DRINK PLENTY OF WATER*  Dispense: 60 Tab; Refill: 0    3. Sore throat    - AMB POC RAPID STREP A    4. Upper respiratory tract infection, unspecified type    Will cover patient with augmentin for >1 week of symptoms. Discussed supportive therapy. Work note provided. Patient to f/u in 3 months for routine visit and labs for HTN. I have discussed the diagnosis with the patient and the intended plan as seen in the above orders. she has expressed understanding. The patient has received an after-visit summary and questions were answered concerning future plans. I have discussed medication side effects and warnings with the patient as well. Electronically Signed: Mimi Sosa NP     History/Allergies   Patients past medical, surgical and family histories were reviewed and updated.     Past Medical History:   Diagnosis Date    Depression 12/12/2014    Essential hypertension with goal blood pressure less than 140/90 5/17/2016    Headache(784.0)     Hx of gynecological procedure 2004    BTL    Keloid 5/17/2016      Past Surgical History:   Procedure Laterality Date    HX GYN       Family History   Problem Relation Age of Onset    Asthma Mother     Hypertension Mother     Asthma Sister     Asthma Maternal Grandmother     Arthritis-osteo Paternal Grandmother     Diabetes Paternal Grandmother     Elevated Lipids Paternal Grandmother     Diabetes Other      Social History     Socioeconomic History    Marital status: SINGLE     Spouse name: Not on file    Number of children: Not on file    Years of education: Not on file    Highest education level: Not on file   Occupational History    Not on file   Social Needs    Financial resource strain: Not on file    Food insecurity:     Worry: Not on file     Inability: Not on file    Transportation needs:     Medical: Not on file     Non-medical: Not on file   Tobacco Use    Smoking status: Former Smoker     Years: 2.00     Types: Cigarettes     Last attempt to quit: 6/1/2016     Years since quitting: 3.4    Smokeless tobacco: Never Used   Substance and Sexual Activity    Alcohol use: No    Drug use: No    Sexual activity: Yes     Partners: Male     Birth control/protection: Surgical, None     Comment: BTL 2004   Lifestyle    Physical activity:     Days per week: Not on file     Minutes per session: Not on file    Stress: Not on file   Relationships    Social connections:     Talks on phone: Not on file     Gets together: Not on file     Attends Sabianist service: Not on file     Active member of club or organization: Not on file     Attends meetings of clubs or organizations: Not on file     Relationship status: Not on file    Intimate partner violence:     Fear of current or ex partner: Not on file     Emotionally abused: Not on file     Physically abused: Not on file     Forced sexual activity: Not on file   Other Topics Concern    Not on file   Social History Narrative    Not on file         No Known Allergies    Disposition     Follow-up and Dispositions  ·   Return in about 3 months (around 2/15/2020) for routine and labs. No future appointments. Current Medications after this visit     Current Outpatient Medications   Medication Sig    hydroCHLOROthiazide (HYDRODIURIL) 25 mg tablet Take 1 Tab by mouth daily.  diclofenac EC (VOLTAREN) 75 mg EC tablet TAKE ONE TABLET BY MOUTH TWICE DAILY WITH FOOD *DRINK PLENTY OF WATER*    amoxicillin-clavulanate (AUGMENTIN) 875-125 mg per tablet Take 1 Tab by mouth two (2) times a day for 5 days. Indications: a common cold     No current facility-administered medications for this visit.       Medications Discontinued During This Encounter   Medication Reason    hydroCHLOROthiazide (HYDRODIURIL) 25 mg tablet Reorder    diclofenac EC (VOLTAREN) 75 mg EC tablet Reorder    amoxicillin-clavulanate (AUGMENTIN) 875-125 mg per tablet Reorder

## 2019-11-18 ENCOUNTER — OFFICE VISIT (OUTPATIENT)
Dept: FAMILY MEDICINE CLINIC | Age: 34
End: 2019-11-18

## 2019-11-18 VITALS
OXYGEN SATURATION: 97 % | DIASTOLIC BLOOD PRESSURE: 91 MMHG | SYSTOLIC BLOOD PRESSURE: 135 MMHG | HEART RATE: 101 BPM | HEIGHT: 65 IN | TEMPERATURE: 99.1 F | BODY MASS INDEX: 48.82 KG/M2 | RESPIRATION RATE: 20 BRPM | WEIGHT: 293 LBS

## 2019-11-18 DIAGNOSIS — R68.89 FLU-LIKE SYMPTOMS: ICD-10-CM

## 2019-11-18 DIAGNOSIS — J00 ACUTE NASOPHARYNGITIS: ICD-10-CM

## 2019-11-18 DIAGNOSIS — J00 COMMON COLD VIRUS: ICD-10-CM

## 2019-11-18 DIAGNOSIS — R05.9 COUGH: Primary | ICD-10-CM

## 2019-11-18 LAB
QUICKVUE INFLUENZA TEST: NEGATIVE
VALID INTERNAL CONTROL?: YES

## 2019-11-18 RX ORDER — AZITHROMYCIN 250 MG/1
TABLET, FILM COATED ORAL
Qty: 6 TAB | Refills: 0 | Status: SHIPPED | OUTPATIENT
Start: 2019-11-18 | End: 2019-11-18 | Stop reason: SDUPTHER

## 2019-11-18 RX ORDER — AZITHROMYCIN 250 MG/1
TABLET, FILM COATED ORAL
Qty: 6 TAB | Refills: 0 | Status: SHIPPED | OUTPATIENT
Start: 2019-11-18 | End: 2019-11-22 | Stop reason: ALTCHOICE

## 2019-11-18 NOTE — PATIENT INSTRUCTIONS
Viral Respiratory Infection: Care Instructions Your Care Instructions Viruses are very small organisms. They grow in number after they enter your body. There are many types that cause different illnesses, such as colds and the mumps. The symptoms of a viral respiratory infection often start quickly. They include a fever, sore throat, and runny nose. You may also just not feel well. Or you may not want to eat much. Most viral respiratory infections are not serious. They usually get better with time and self-care. Antibiotics are not used to treat a viral infection. That's because antibiotics will not help cure a viral illness. In some cases, antiviral medicine can help your body fight a serious viral infection. Follow-up care is a key part of your treatment and safety. Be sure to make and go to all appointments, and call your doctor if you are having problems. It's also a good idea to know your test results and keep a list of the medicines you take. How can you care for yourself at home? · Rest as much as possible until you feel better. · Be safe with medicines. Take your medicine exactly as prescribed. Call your doctor if you think you are having a problem with your medicine. You will get more details on the specific medicine your doctor prescribes. · Take an over-the-counter pain medicine, such as acetaminophen (Tylenol), ibuprofen (Advil, Motrin), or naproxen (Aleve), as needed for pain and fever. Read and follow all instructions on the label. Do not give aspirin to anyone younger than 20. It has been linked to Reye syndrome, a serious illness. · Drink plenty of fluids, enough so that your urine is light yellow or clear like water. Hot fluids, such as tea or soup, may help relieve congestion in your nose and throat. If you have kidney, heart, or liver disease and have to limit fluids, talk with your doctor before you increase the amount of fluids you drink. · Try to clear mucus from your lungs by breathing deeply and coughing. · Gargle with warm salt water once an hour. This can help reduce swelling and throat pain. Use 1 teaspoon of salt mixed in 1 cup of warm water. · Do not smoke or allow others to smoke around you. If you need help quitting, talk to your doctor about stop-smoking programs and medicines. These can increase your chances of quitting for good. To avoid spreading the virus · Cough or sneeze into a tissue. Then throw the tissue away. · If you don't have a tissue, use your hand to cover your cough or sneeze. Then clean your hand. You can also cough into your sleeve. · Wash your hands often. Use soap and warm water. Wash for 15 to 20 seconds each time. · If you don't have soap and water near you, you can clean your hands with alcohol wipes or gel. When should you call for help? Call your doctor now or seek immediate medical care if: 
  · You have a new or higher fever.  
  · Your fever lasts more than 48 hours.  
  · You have trouble breathing.  
  · You have a fever with a stiff neck or a severe headache.  
  · You are sensitive to light.  
  · You feel very sleepy or confused.  
 Watch closely for changes in your health, and be sure to contact your doctor if: 
  · You do not get better as expected. Where can you learn more? Go to http://beena-quyen.info/. Enter I519 in the search box to learn more about \"Viral Respiratory Infection: Care Instructions. \" Current as of: June 9, 2019 Content Version: 12.2 © 3351-4490 [employer]. Care instructions adapted under license by [employer] (which disclaims liability or warranty for this information). If you have questions about a medical condition or this instruction, always ask your healthcare professional. [address] any warranty or liability for your use of this information.

## 2019-11-18 NOTE — PROGRESS NOTES
Chief Complaint   Patient presents with    Cough     worsening symptoms     Visit Vitals  BP (!) 135/91 (BP 1 Location: Right arm, BP Patient Position: Sitting)   Pulse (!) 101   Temp 99.1 °F (37.3 °C) (Oral)   Resp 20   Ht 5' 5\" (1.651 m)   Wt (!) 375 lb (170.1 kg)   SpO2 97%   BMI 62.40 kg/m²       1. Have you been to the ER, urgent care clinic since your last visit? Hospitalized since your last visit? No    2. Have you seen or consulted any other health care providers outside of the [address] [place] [address] since your last visit? Include any pap smears or colon screening.  No    Reviewed record in preparation for visit and have necessary documentation  Pt did not bring medication to office visit for review  opportunity was given for questions  Goals that were addressed and/or need to be completed during or after this appointment include   Health Maintenance Due   Topic Date Due    DTaP/Tdap/Td series (1 - Tdap) 03/03/2004    Influenza Age 5 to Adult  08/01/2019

## 2019-11-18 NOTE — LETTER
NOTIFICATION OF RETURN TO WORK / SCHOOL 
 
11/18/2019 4:27 PM 
 
Ms. Florentino Baker [address] [facility] To Whom It May Concern: 
 
Florentino Baker was under the care of Shai Espinoza. She will be able to return to work/school on 11/21/19. Please excuse her absence 11/18/19 and 11/19/19. If there are questions or concerns please have the patient contact our office. Sincerely, Mimi Sosa NP

## 2019-11-18 NOTE — LETTER
11/18/2019 4:23 PM 
 
Ms. Teresa Streeter [address] [facility] Dear Teresa Streeter: 
 
Please find your most recent results below. Resulted Orders XR CHEST PA LAT (Exam End: 11/18/2019  4:14 PM) Narrative Exam:  2 view chest 
 
Indication: Worsening cough. COMPARISON: 3/19/2019 PA and lateral views demonstrate normal heart size. There is no acute process in 
the lung fields. The osseous structures are unremarkable. Impression Impression: No acute process. Please call me if you have any questions: [phone] Sincerely, Sydnee Benjamin NP

## 2019-11-18 NOTE — PROGRESS NOTES
Lavern Patient  37 y.o. female  1985  [address]  [id number]     [facility] Practice: Progress Note       Encounter Date: 11/18/2019    Chief Complaint   Patient presents with    Cough     worsening symptoms     History of Present Illness   Lavern Patient is a 37 y.o. female who presents to clinic today for:    Cough- 11/15/2019 encounter reviewed-URI symptoms for ~1 week and covered with Augmentin. Rapid strep-negative. Today presents with continued productive cough (yellow/green phlegm), sneezing, cold chills, sweating, intermittent wheeze. Overall looks sick and not feeling well. Decreased appetite but tolerating fluids/hot tea. Treatment-thera flu, cough medicine and cough drops. LMP-11/01/2019. Health Maintenance    Health Maintenance Due   Topic Date Due    DTaP/Tdap/Td series (1 - Tdap) 03/03/2004    Influenza Age 5 to Adult  08/01/2019     Review of Systems   Review of Systems   Constitutional: Positive for chills and fever. HENT: Negative. Eyes: Negative. Respiratory: Positive for cough and wheezing. Cardiovascular: Negative. Gastrointestinal: Negative. Genitourinary: Negative. Musculoskeletal: Negative. Skin: Negative. Neurological: Negative. Endo/Heme/Allergies: Negative. Psychiatric/Behavioral: Negative. Vitals/Objective:     Vitals:    11/18/19 1544   BP: (!) 135/91   Pulse: (!) 101   Resp: 20   Temp: 99.1 °F (37.3 °C)   TempSrc: Oral   SpO2: 97%   Weight: (!) 375 lb (170.1 kg)   Height: 5' 5\" (1.651 m)     Body mass index is 62.4 kg/m². Physical Exam   Constitutional: She is oriented to person, place, and time. She is cooperative. She appears ill. Cardiovascular: Normal rate, regular rhythm, normal heart sounds and normal pulses. Pulmonary/Chest: Effort normal and breath sounds normal.   Neurological: She is alert and oriented to person, place, and time. Skin: Skin is warm, dry and intact. Psychiatric: She has a normal mood and affect. Her speech is normal and behavior is normal. Judgment and thought content normal. Cognition and memory are normal.       Recent Results (from the past 24 hour(s))   AMB POC RAPID INFLUENZA TEST    Collection Time: 11/18/19  4:05 PM   Result Value Ref Range    VALID INTERNAL CONTROL POC Yes     QuickVue Influenza test Negative Negative     Assessment and Plan:   1. Cough    - AMB POC RAPID INFLUENZA TEST  - XR CHEST PA LAT; Future    Tolerated tylenol 650mg and water in clinic for low grade fever. Normal chest xray and negative rapid flu. Discussed likely viral illness but given patient's appearance and symptoms will discontinue the augmentin (GI upset) and cover with azithromycin. Discussed true viral illness vs bacterial.  Discussed fluids, rest and supportive OTC medications. Work note provided. Strict ED parameters for worsening symptoms. I have discussed the diagnosis with the patient and the intended plan as seen in the above orders. she has expressed understanding. The patient has received an after-visit summary and questions were answered concerning future plans. I have discussed medication side effects and warnings with the patient as well. Electronically Signed: Henna Moreno NP     History/Allergies   Patients past medical, surgical and family histories were reviewed and updated.     Past Medical History:   Diagnosis Date    Depression 12/12/2014    Essential hypertension with goal blood pressure less than 140/90 5/17/2016    Headache(784.0)     Hx of gynecological procedure 2004    BTL    Keloid 5/17/2016      Past Surgical History:   Procedure Laterality Date    HX GYN       Family History   Problem Relation Age of Onset    Asthma Mother     Hypertension Mother     Asthma Sister     Asthma Maternal Grandmother     Arthritis-osteo Paternal Grandmother     Diabetes Paternal Grandmother     Elevated Lipids Paternal Grandmother     Diabetes Other      Social History     Socioeconomic History    Marital status: SINGLE     Spouse name: Not on file    Number of children: Not on file    Years of education: Not on file    Highest education level: Not on file   Occupational History    Not on file   Social Needs    Financial resource strain: Not on file    Food insecurity:     Worry: Not on file     Inability: Not on file    Transportation needs:     Medical: Not on file     Non-medical: Not on file   Tobacco Use    Smoking status: Former Smoker     Years: 2.00     Types: Cigarettes     Last attempt to quit: 6/1/2016     Years since quitting: 3.4    Smokeless tobacco: Never Used   Substance and Sexual Activity    Alcohol use: No    Drug use: No    Sexual activity: Yes     Partners: Male     Birth control/protection: Surgical, None     Comment: BTL 2004   Lifestyle    Physical activity:     Days per week: Not on file     Minutes per session: Not on file    Stress: Not on file   Relationships    Social connections:     Talks on phone: Not on file     Gets together: Not on file     Attends Mosque service: Not on file     Active member of club or organization: Not on file     Attends meetings of clubs or organizations: Not on file     Relationship status: Not on file    Intimate partner violence:     Fear of current or ex partner: Not on file     Emotionally abused: Not on file     Physically abused: Not on file     Forced sexual activity: Not on file   Other Topics Concern    Not on file   Social History Narrative    Not on file         No Known Allergies    Disposition     Follow-up and Dispositions  ·   Return if symptoms worsen or fail to improve. No future appointments.          Current Medications after this visit     Current Outpatient Medications   Medication Sig    azithromycin (ZITHROMAX) 250 mg tablet Take 2 tablets today, then take 1 tablet daily    amoxicillin-clavulanate (AUGMENTIN) 875-125 mg per tablet Take 1 Tab by mouth two (2) times a day for 5 days. Indications: a common cold    hydroCHLOROthiazide (HYDRODIURIL) 25 mg tablet Take 1 Tab by mouth daily.  diclofenac EC (VOLTAREN) 75 mg EC tablet TAKE ONE TABLET BY MOUTH TWICE DAILY WITH FOOD *DRINK PLENTY OF WATER*     No current facility-administered medications for this visit.       Medications Discontinued During This Encounter   Medication Reason    azithromycin (ZITHROMAX) 250 mg tablet Reorder

## 2019-11-22 ENCOUNTER — TELEPHONE (OUTPATIENT)
Dept: FAMILY MEDICINE CLINIC | Age: 34
End: 2019-11-22

## 2019-11-22 DIAGNOSIS — B37.9 ANTIBIOTIC-INDUCED YEAST INFECTION: Primary | ICD-10-CM

## 2019-11-22 DIAGNOSIS — T36.95XA ANTIBIOTIC-INDUCED YEAST INFECTION: Primary | ICD-10-CM

## 2019-11-22 RX ORDER — FLUCONAZOLE 150 MG/1
TABLET ORAL
Qty: 2 TAB | Refills: 0 | Status: SHIPPED | OUTPATIENT
Start: 2019-11-22 | End: 2021-09-23

## 2019-11-22 NOTE — TELEPHONE ENCOUNTER
Patient called per NP:  Prescription sent to [facility] tab today and 1 tab in 3 days for antibiotic induced yeast infection. Patient verbalized understanding and appreciative.

## 2020-01-21 ENCOUNTER — HOSPITAL ENCOUNTER (OUTPATIENT)
Dept: LAB | Age: 35
Discharge: HOME | End: 2020-01-21
Payer: MEDICAID

## 2020-01-21 PROCEDURE — 88305 TISSUE EXAM BY PATHOLOGIST: CPT

## 2020-03-10 DIAGNOSIS — I10 ESSENTIAL HYPERTENSION WITH GOAL BLOOD PRESSURE LESS THAN 140/90: ICD-10-CM

## 2020-03-10 DIAGNOSIS — R10.30 LOWER ABDOMINAL PAIN: ICD-10-CM

## 2020-03-10 RX ORDER — HYDROCHLOROTHIAZIDE 25 MG/1
25 TABLET ORAL DAILY
Qty: 30 TAB | Refills: 0 | Status: SHIPPED | OUTPATIENT
Start: 2020-03-10 | End: 2020-07-20 | Stop reason: SDUPTHER

## 2020-03-10 RX ORDER — DICLOFENAC SODIUM 75 MG/1
TABLET, DELAYED RELEASE ORAL
Qty: 60 TAB | Refills: 0 | Status: SHIPPED | OUTPATIENT
Start: 2020-03-10 | End: 2020-07-20 | Stop reason: SDUPTHER

## 2020-07-20 ENCOUNTER — VIRTUAL VISIT (OUTPATIENT)
Dept: FAMILY MEDICINE CLINIC | Age: 35
End: 2020-07-20

## 2020-07-20 DIAGNOSIS — M25.562 CHRONIC PAIN OF BOTH KNEES: ICD-10-CM

## 2020-07-20 DIAGNOSIS — I10 ESSENTIAL HYPERTENSION WITH GOAL BLOOD PRESSURE LESS THAN 140/90: ICD-10-CM

## 2020-07-20 DIAGNOSIS — Z13.220 SCREENING CHOLESTEROL LEVEL: ICD-10-CM

## 2020-07-20 DIAGNOSIS — I10 ESSENTIAL HYPERTENSION WITH GOAL BLOOD PRESSURE LESS THAN 140/90: Primary | ICD-10-CM

## 2020-07-20 DIAGNOSIS — G89.29 CHRONIC PAIN OF BOTH KNEES: ICD-10-CM

## 2020-07-20 DIAGNOSIS — M25.561 CHRONIC PAIN OF BOTH KNEES: ICD-10-CM

## 2020-07-20 RX ORDER — BLOOD PRESSURE TEST KIT-MEDIUM
KIT MISCELLANEOUS
Qty: 1 KIT | Refills: 0 | Status: SHIPPED | OUTPATIENT
Start: 2020-07-20

## 2020-07-20 RX ORDER — HYDROCHLOROTHIAZIDE 25 MG/1
25 TABLET ORAL DAILY
Qty: 90 TAB | Refills: 1 | Status: SHIPPED | OUTPATIENT
Start: 2020-07-20 | End: 2021-03-12

## 2020-07-20 RX ORDER — DICLOFENAC SODIUM 75 MG/1
TABLET, DELAYED RELEASE ORAL
Qty: 180 TAB | Refills: 1 | Status: SHIPPED | OUTPATIENT
Start: 2020-07-20 | End: 2021-03-12

## 2020-07-20 NOTE — PROGRESS NOTES
Cristal Parra is a 38 y.o. female evaluated via Doximetry on 20. Patient Identity confirmed by . Consent:  He and/or health care decision maker is aware that that he may receive a bill for this telephone service, depending on his insurance coverage, and has provided verbal consent to proceed: Yes    Physician Location: Office  Patient Location: Home  Nurse Assisting with Encounter: Heri Mendez LPN    Chief Complaint   Patient presents with    Medication Refill      Information gathered from patient and/or health care decision maker. HPI:   Hypertension Follow up:  Currently Taking HCTZ 25 mg. The patient reports:  taking medications as instructed, no medication side effects noted, Denies checking BP at home, no TIA's, no chest pain on exertion, no dyspnea on exertion, no swelling of ankles. When elevated does note headaches  BP Readings from Last 3 Encounters:   19 (!) 135/91   11/15/19 142/89   19 134/88      Chronic Knee Pain/Swelling: Left worse than the right chronically. Has been off of Voltaren for some time, noting increased leg swelling. 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 HENT: Negative for congestion. Respiratory: Negative for cough. Cardiovascular: Negative for chest pain and palpitations. Limited Exam:  Due to this being a TeleHealth evaluation, many elements of the physical examination are unable to be assessed.       Constitutional: Appears well-developed and well-nourished in no apparent distress   Mental status: Alert and awake, Oriented to person/place/time, Able to follow commands  Eyes: EOM normal, Sclera normal, No visible ocular discharge  HENT: Normocephalic, atraumatic; Mouth/Throat: Moist mucous membranes, External Ears normal  Neck: No visualized mass  Pulmonary/Chest: Respiratory effort normal, No visualized signs of difficulty breathing or respiratory distress   Musculoskeletal: Normal gait with no signs of ataxia, Normal range of motion of neck  Neurological: No facial asymmetry (Cranial nerve 7 motor function), No gaze palsy  Skin: No significant exanthematous lesions or discoloration noted on facial skin  Psychiatric: Normal affect, normal judgment/insight. No hallucinations     Current Outpatient Medications on File Prior to Visit   Medication Sig Dispense Refill    [DISCONTINUED] hydroCHLOROthiazide (HYDRODIURIL) 25 mg tablet Take 1 Tab by mouth daily. 30 Tab 0    [DISCONTINUED] diclofenac EC (VOLTAREN) 75 mg EC tablet TAKE ONE TABLET BY MOUTH TWICE DAILY WITH FOOD *DRINK PLENTY OF WATER* 60 Tab 0    fluconazole (DIFLUCAN) 150 mg tablet Take 1 tablet today and another tablet in 3 days. 2 Tab 0     No current facility-administered medications on file prior to visit. No Known Allergies     Patient Active Problem List    Diagnosis Date Noted    Major depressive disorder with single episode, in full remission ([facility] 04/20/2018    Essential hypertension with goal blood pressure less than 140/90 05/17/2016    Morbid obesity ([facility] 05/17/2016    Keloid 05/17/2016    Depression 12/12/2014    Anxiety 12/12/2014    Mittelschmerz 10/30/2012        Health Maintenance Due   Topic Date Due    DTaP/Tdap/Td series (1 - Tdap) 03/03/2004    PAP AKA CERVICAL CYTOLOGY  04/14/2020        Assessment/Plan:   Details of this discussion including any medical advice provided: Refill on meds, Labs, follow up in office for CPE and PAP    ICD-10-CM ICD-9-CM    1. Essential hypertension with goal blood pressure less than 140/90  I10 401.9 hydroCHLOROthiazide (HYDRODIURIL) 25 mg tablet      CBC W/O DIFF      METABOLIC PANEL, COMPREHENSIVE      Blood Pressure Kit-Extra Large kit   2. Chronic pain of both knees  M25.561 719.46 diclofenac EC (VOLTAREN) 75 mg EC tablet    M25.562 338.29     G89.29     3.  Screening cholesterol level  Z13.220 V77.91 LIPID PANEL Follow-up and Dispositions    · Return in about 1 week (around 7/27/2020) for Follow up In Clinic for CPE and PAP testing. Total Time: minutes: 11-20 minutes was spent on telemedicine encounter discussing above problems and plans. Patient Problem list, medications, and Allergies were reviewed during this encounter. Pursuant to the emergency declaration under the [address] [place] [address] [facility] authority and the [employer] Act, this Telephone Visit was conducted, with patient's consent, to reduce the patient's risk of exposure to COVID-19 and provide continuity of care for an established patient. I affirm this is a Patient Initiated Episode with an Established Patient who has not had a related appointment within my department in the past 7 days or scheduled within the next 24 hours. Discussed diagnoses in detail with patient. Medication risks/benefits/side effects discussed with patient. All of the patient's questions were addressed. The patient understands and agrees with our plan of care. The patient knows to call back if they are unsure of or forget any changes we discussed today or if the symptoms change.     Note: not billable if this call serves to triage the patient into an appointment for the relevant concern    MD RUSTY Najera & HANS NOEL [facility]  07/20/20

## 2020-07-20 NOTE — PROGRESS NOTES
1. Have you been to the ER, urgent care clinic since your last visit? Hospitalized since your last visit? No    2. Have you seen or consulted any other health care providers outside of the [address] [place] [address] since your last visit? Include any pap smears or colon screening.  No        Health Maintenance Due   Topic Date Due    DTaP/Tdap/Td series (1 - Tdap) 03/03/2004    PAP AKA CERVICAL CYTOLOGY  04/14/2020

## 2020-07-22 ENCOUNTER — HOSPITAL ENCOUNTER (OUTPATIENT)
Dept: RADIATION THERAPY | Age: 35
Discharge: HOME | End: 2020-07-22

## 2021-02-02 ENCOUNTER — HOSPITAL ENCOUNTER (OUTPATIENT)
Dept: RADIATION THERAPY | Age: 36
Discharge: HOME | End: 2021-02-02

## 2021-02-02 VITALS — BODY MASS INDEX: 47.09 KG/M2 | WEIGHT: 293 LBS | HEIGHT: 66 IN

## 2021-09-23 ENCOUNTER — OFFICE VISIT (OUTPATIENT)
Dept: FAMILY MEDICINE CLINIC | Age: 36
End: 2021-09-23
Payer: MEDICAID

## 2021-09-23 VITALS
OXYGEN SATURATION: 95 % | WEIGHT: 293 LBS | RESPIRATION RATE: 18 BRPM | DIASTOLIC BLOOD PRESSURE: 94 MMHG | BODY MASS INDEX: 47.09 KG/M2 | TEMPERATURE: 97.1 F | SYSTOLIC BLOOD PRESSURE: 145 MMHG | HEIGHT: 66 IN | HEART RATE: 115 BPM

## 2021-09-23 DIAGNOSIS — E66.01 MORBID OBESITY (HCC): ICD-10-CM

## 2021-09-23 DIAGNOSIS — Z00.00 WELL WOMAN EXAM (NO GYNECOLOGICAL EXAM): Primary | ICD-10-CM

## 2021-09-23 DIAGNOSIS — F32.5 MAJOR DEPRESSIVE DISORDER WITH SINGLE EPISODE, IN FULL REMISSION (HCC): ICD-10-CM

## 2021-09-23 DIAGNOSIS — Z13.1 SCREENING FOR DIABETES MELLITUS: ICD-10-CM

## 2021-09-23 DIAGNOSIS — G89.29 CHRONIC PAIN OF BOTH KNEES: ICD-10-CM

## 2021-09-23 DIAGNOSIS — Z13.220 SCREENING, LIPID: ICD-10-CM

## 2021-09-23 DIAGNOSIS — M25.562 CHRONIC PAIN OF BOTH KNEES: ICD-10-CM

## 2021-09-23 DIAGNOSIS — M25.561 CHRONIC PAIN OF BOTH KNEES: ICD-10-CM

## 2021-09-23 DIAGNOSIS — I10 ESSENTIAL HYPERTENSION WITH GOAL BLOOD PRESSURE LESS THAN 140/90: ICD-10-CM

## 2021-09-23 PROCEDURE — 99395 PREV VISIT EST AGE 18-39: CPT | Performed by: FAMILY MEDICINE

## 2021-09-23 RX ORDER — HYDROCHLOROTHIAZIDE 25 MG/1
25 TABLET ORAL DAILY
Qty: 90 TABLET | Refills: 1 | Status: SHIPPED | OUTPATIENT
Start: 2021-09-23

## 2021-09-23 RX ORDER — DICLOFENAC SODIUM 75 MG/1
75 TABLET, DELAYED RELEASE ORAL 2 TIMES DAILY
Qty: 180 TABLET | Refills: 1 | Status: SHIPPED | OUTPATIENT
Start: 2021-09-23

## 2021-09-23 NOTE — PROGRESS NOTES
Chief Complaint   Patient presents with    Well Woman     Visit Vitals  BP (!) 150/92 (BP 1 Location: Left arm)   Pulse (!) 115   Temp 97.1 °F (36.2 °C) (Temporal)   Resp 18   Ht 5' 6\" (1.676 m)   Wt (!) 373 lb 12.8 oz (169.6 kg)   LMP 09/14/2021   SpO2 95%   BMI 60.33 kg/m²     1. Have you been to the ER, urgent care clinic since your last visit? Hospitalized since your last visit? No    2. Have you seen or consulted any other health care providers outside of the [address] [place] [address] since your last visit? Include any pap smears or colon screening.  No    Reviewed record in preparation for visit and have necessary documentation  Pt did not bring medication to office visit for review  opportunity was given for questions  Goals that were addressed and/or need to be completed during or after this appointment include   Health Maintenance Due   Topic Date Due    Hepatitis C Screening  Never done    COVID-19 Vaccine (1) Never done    DTaP/Tdap/Td series (1 - Tdap) Never done    Cervical cancer screen  04/14/2020    Flu Vaccine (1) 09/01/2021

## 2021-09-23 NOTE — PROGRESS NOTES
[facility]    History of Present Illness:   Adrianna Bernard is a 43 y.o. female with history of HTN, Obesity, Depression  CC: Well woman exam  History provided by patient and Records    HPI:    Well Woman exam:  Adrianan Bernard is a 43 y.o. female presenting for well woman exam.     How would you rate your health in generally over the last year? Good  Has your physical and emotional health limited your social activities with family or friends? no  Ability to perform activities of daily living? completes all daily living activities without any functional limitations    Current Complaints? Knee pain bilaterally, needs refill of Diclofenac. Does not always use HCTZ regularly, but normally does. Obesity is unchanged. Depression is under control. (See attached Problem visit note if applicable)      Menstrual/Sexual History:  Age at which menses began: 13 y.o. Last menstrual period was -  Length of periods: 4-5 days  Number of days between periods: 28-30  Menstrual flow: Normal    PAP History: Normal      Sexually active: Yes  Number of sexual partners: 1  Type of sexual partners: Male  Method of family planning: Tubes tied    Risk factors for breast cancer: Low risk    Diet/Exercise History:  Diet: Favorite food Seafood. - Does patient eat at least 5 servings of Fruits/vegetables daily? No   - Is patient currently dieting? No    Exercise: Patient does not have structured exercise  - Does patient get 150 minutes of structured exercise weekly? 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 None    Healthcare maintenance:   Health Maintenance Due   Topic Date Due    Hepatitis C Screening  Never done    COVID-19 Vaccine (1) Never done    DTaP/Tdap/Td series (1 - Tdap) Never done    Cervical cancer screen  2020    Flu Vaccine (1) 2021     Mammogram indicated? No   Colonoscopy indicated? No   DEXA scan indicated?   No   HIV/STI testing indicated? No   Hepatitis C testing indicated? Yes  Lung cancer screening indicated? No   AAA screening indicated? No     Immunization History   Administered Date(s) Administered    Influenza Vaccine 10/24/2016    TB Skin Test (PPD) Intradermal 11/04/2014, 12/12/2014     Flu indicated? Yes  Tdap indicated? Yes  Pneumovax indicated? No   Zostervax indicated? No   Meningococcal indicated? No       Medications Reviewed  Current Outpatient Medications on File Prior to Visit   Medication Sig Dispense Refill    [DISCONTINUED] hydroCHLOROthiazide (HYDRODIURIL) 25 mg tablet TAKE ONE TABLET BY MOUTH EVERY DAY 30 Tab 0    [DISCONTINUED] diclofenac EC (VOLTAREN) 75 mg EC tablet TAKE ONE TABLET BY MOUTH TWICE DAILY WITH FOOD *DRINK PLENTY OF WATER* (Patient not taking: Reported on 9/23/2021) 60 Tab 0    Blood Pressure Kit-Extra Large kit Check Blood Pressure Daily 1 Kit 0    [DISCONTINUED] fluconazole (DIFLUCAN) 150 mg tablet Take 1 tablet today and another tablet in 3 days. (Patient not taking: Reported on 9/23/2021) 2 Tab 0     No current facility-administered medications on file prior to visit. Past Medical, Family, and Social History: Allergies Reviewed:  No Known Allergies    Medical History Reviewed  Current Outpatient Medications on File Prior to Visit   Medication Sig Dispense Refill    [DISCONTINUED] hydroCHLOROthiazide (HYDRODIURIL) 25 mg tablet TAKE ONE TABLET BY MOUTH EVERY DAY 30 Tab 0    [DISCONTINUED] diclofenac EC (VOLTAREN) 75 mg EC tablet TAKE ONE TABLET BY MOUTH TWICE DAILY WITH FOOD *DRINK PLENTY OF WATER* (Patient not taking: Reported on 9/23/2021) 60 Tab 0    Blood Pressure Kit-Extra Large kit Check Blood Pressure Daily 1 Kit 0    [DISCONTINUED] fluconazole (DIFLUCAN) 150 mg tablet Take 1 tablet today and another tablet in 3 days. (Patient not taking: Reported on 9/23/2021) 2 Tab 0     No current facility-administered medications on file prior to visit.        Surgical History Reviewed  Past Surgical History:   Procedure Laterality Date    HX GYN         Family History Reviewed  Family History   Problem Relation Age of Onset    Asthma Mother     Hypertension Mother     Asthma Sister     Asthma Maternal Grandmother     Arthritis-osteo Paternal Grandmother     Diabetes Paternal Grandmother     Elevated Lipids Paternal Grandmother     Diabetes Other        Social History Reviewed  Social History     Socioeconomic History    Marital status: SINGLE     Spouse name: Not on file    Number of children: Not on file    Years of education: Not on file    Highest education level: Not on file   Occupational History    Not on file   Tobacco Use    Smoking status: Former Smoker     Years: 2.00     Types: Cigarettes     Quit date: 2016     Years since quittin.3    Smokeless tobacco: Never Used   Substance and Sexual Activity    Alcohol use: No    Drug use: No    Sexual activity: Yes     Partners: Male     Birth control/protection: Surgical, None     Comment: BTL 2004   Other Topics Concern    Not on file   Social History Narrative    Not on file     Social Determinants of Health     Financial Resource Strain:     Difficulty of Paying Living Expenses:    Food Insecurity:     Worried About Running Out of Food in the Last Year:     Ran Out of Food in the Last Year:    Transportation Needs:     Lack of Transportation (Medical):      Lack of Transportation (Non-Medical):    Physical Activity:     Days of Exercise per Week:     Minutes of Exercise per Session:    Stress:     Feeling of Stress :    Social Connections:     Frequency of Communication with Friends and Family:     Frequency of Social Gatherings with Friends and Family:     Attends Spiritism Services:     Active Member of Clubs or Organizations:     Attends Club or Organization Meetings:     Marital Status:    Intimate Partner Violence:     Fear of Current or Ex-Partner:     Emotionally Abused:  Physically Abused:     Sexually Abused:        Review of Systems   Review of Systems   Constitutional: Negative for chills and fever. HENT: Negative for congestion. Cardiovascular: Negative for chest pain and palpitations. Gastrointestinal: Negative for nausea and vomiting. Neurological: Negative for dizziness and headaches. Objective:     Visit Vitals  BP (!) 150/92 (BP 1 Location: Left arm)   Pulse (!) 115   Temp 97.1 °F (36.2 °C) (Temporal)   Resp 18   Ht 5' 6\" (1.676 m)   Wt (!) 373 lb 12.8 oz (169.6 kg)   LMP 09/14/2021   SpO2 95%   BMI 60.33 kg/m²        Physical Exam  Vitals and nursing note reviewed. Constitutional:       Appearance: Normal appearance. HENT:      Head: Normocephalic and atraumatic. Cardiovascular:      Rate and Rhythm: Normal rate and regular rhythm. Pulses: Normal pulses. Heart sounds: Normal heart sounds. No murmur heard. No friction rub. No gallop. Pulmonary:      Effort: Pulmonary effort is normal.      Breath sounds: Normal breath sounds. Abdominal:      General: Abdomen is flat. Bowel sounds are normal.      Palpations: Abdomen is soft. Musculoskeletal:      Cervical back: Normal range of motion and neck supple. Skin:     General: Skin is warm and dry. Neurological:      Mental Status: She is alert.          Pertinent Labs/Studies:  Lab Results   Component Value Date/Time    Cholesterol, total 148 04/20/2018 04:34 PM    HDL Cholesterol 44 04/20/2018 04:34 PM    LDL, calculated 84 04/20/2018 04:34 PM    VLDL, calculated 20 04/20/2018 04:34 PM    Triglyceride 100 04/20/2018 04:34 PM     Lab Results   Component Value Date/Time    Sodium 142 03/18/2019 02:44 PM    Potassium 4.1 03/18/2019 02:44 PM    Chloride 100 03/18/2019 02:44 PM    CO2 24 03/18/2019 02:44 PM    Glucose 94 03/18/2019 02:44 PM    BUN 10 03/18/2019 02:44 PM    Creatinine 0.79 03/18/2019 02:44 PM    BUN/Creatinine ratio 13 03/18/2019 02:44 PM    GFR est  03/18/2019 02:44 PM    GFR est non-AA 97 03/18/2019 02:44 PM    Calcium 8.9 03/18/2019 02:44 PM    Bilirubin, total 0.3 03/18/2019 02:44 PM    Alk. phosphatase 132 (H) 03/18/2019 02:44 PM    Protein, total 6.8 03/18/2019 02:44 PM    Albumin 3.5 03/18/2019 02:44 PM    A-G Ratio 1.1 (L) 03/18/2019 02:44 PM    ALT (SGPT) 30 03/18/2019 02:44 PM     Lab Results   Component Value Date/Time    WBC 13.4 (H) 03/18/2019 02:44 PM    HGB 11.5 03/18/2019 02:44 PM    HCT 34.4 03/18/2019 02:44 PM    PLATELET 011 (H) 35/56/6066 02:44 PM    MCV 88 03/18/2019 02:44 PM         Assessment and orders:       ICD-10-CM ICD-9-CM    1. Well woman exam (no gynecological exam)  Z00.00 V70.0    2. Essential hypertension with goal blood pressure less than 140/90  I10 401.9 hydroCHLOROthiazide (HYDRODIURIL) 25 mg tablet      CBC W/O DIFF      METABOLIC PANEL, COMPREHENSIVE   3. Chronic pain of both knees  M25.561 719.46 diclofenac EC (VOLTAREN) 75 mg EC tablet    M25.562 338.29     G89.29     4. Morbid obesity ([facility]  E66.01 278.01    5. Major depressive disorder with single episode, in full remission ([facility]  F32.5 296.26    6. Screening, lipid  Z13.220 V77.91 LIPID PANEL   7. Screening for diabetes mellitus  Z13.1 V77.1 HEMOGLOBIN A1C WITH EAG     Diagnoses and all orders for this visit:    1. Well woman exam (no gynecological exam): Overall stable    2. Essential hypertension with goal blood pressure less than 140/90: Our goal is to normalize the blood pressure to decrease the risks of strokes and heart attacks. The patient is in agreement with the plan. -     hydroCHLOROthiazide (HYDRODIURIL) 25 mg tablet; Take 1 Tablet by mouth daily.  -     CBC W/O DIFF; Future  -     METABOLIC PANEL, COMPREHENSIVE; Future    3. Chronic pain of both knees: Has arthritis and spurring per patient. -     diclofenac EC (VOLTAREN) 75 mg EC tablet; Take 1 Tablet by mouth two (2) times a day. 4. Morbid obesity ([address]    5.  Major depressive disorder with single episode, in full remission ([facility]    6. Screening, lipid  -     LIPID PANEL; Future    7. Screening for diabetes mellitus  -     HEMOGLOBIN A1C WITH EAG; Future      Follow-up and Dispositions    · Return in about 3 months (around 12/23/2021) for Pap Test.           I have discussed the diagnosis with the patient and the intended plan as seen in the above orders. Social history, medical history, and labs were reviewed. The patient has received an after-visit summary and questions were answered concerning future plans. I have discussed medication side effects and warnings with the patient as well.     MD RUSTY Crawley & HANS NOEL [facility]  09/23/21

## 2021-09-24 LAB
ALBUMIN SERPL-MCNC: 4.1 G/DL (ref 3.8–4.8)
ALBUMIN/GLOB SERPL: 1.4 {RATIO} (ref 1.2–2.2)
ALP SERPL-CCNC: 77 IU/L (ref 44–121)
ALT SERPL-CCNC: 17 IU/L (ref 0–32)
AST SERPL-CCNC: 15 IU/L (ref 0–40)
BILIRUB SERPL-MCNC: 0.3 MG/DL (ref 0–1.2)
BUN SERPL-MCNC: 9 MG/DL (ref 6–20)
BUN/CREAT SERPL: 10 (ref 9–23)
CALCIUM SERPL-MCNC: 9.5 MG/DL (ref 8.7–10.2)
CHLORIDE SERPL-SCNC: 102 MMOL/L (ref 96–106)
CHOLEST SERPL-MCNC: 162 MG/DL (ref 100–199)
CO2 SERPL-SCNC: 25 MMOL/L (ref 20–29)
CREAT SERPL-MCNC: 0.92 MG/DL (ref 0.57–1)
ERYTHROCYTE [DISTWIDTH] IN BLOOD BY AUTOMATED COUNT: 11.8 % (ref 11.7–15.4)
EST. AVERAGE GLUCOSE BLD GHB EST-MCNC: 108 MG/DL
GLOBULIN SER CALC-MCNC: 3 G/DL (ref 1.5–4.5)
GLUCOSE SERPL-MCNC: 102 MG/DL (ref 65–99)
HBA1C MFR BLD: 5.4 % (ref 4.8–5.6)
HCT VFR BLD AUTO: 38.2 % (ref 34–46.6)
HDLC SERPL-MCNC: 44 MG/DL
HGB BLD-MCNC: 12.8 G/DL (ref 11.1–15.9)
LDLC SERPL CALC-MCNC: 105 MG/DL (ref 0–99)
MCH RBC QN AUTO: 29 PG (ref 26.6–33)
MCHC RBC AUTO-ENTMCNC: 33.5 G/DL (ref 31.5–35.7)
MCV RBC AUTO: 86 FL (ref 79–97)
PLATELET # BLD AUTO: 471 X10E3/UL (ref 150–450)
POTASSIUM SERPL-SCNC: 4.1 MMOL/L (ref 3.5–5.2)
PROT SERPL-MCNC: 7.1 G/DL (ref 6–8.5)
RBC # BLD AUTO: 4.42 X10E6/UL (ref 3.77–5.28)
SODIUM SERPL-SCNC: 140 MMOL/L (ref 134–144)
TRIGL SERPL-MCNC: 69 MG/DL (ref 0–149)
VLDLC SERPL CALC-MCNC: 13 MG/DL (ref 5–40)
WBC # BLD AUTO: 6.9 X10E3/UL (ref 3.4–10.8)

## 2022-03-18 PROBLEM — F32.5 MAJOR DEPRESSIVE DISORDER WITH SINGLE EPISODE, IN FULL REMISSION (HCC): Status: ACTIVE | Noted: 2018-04-20

## 2022-12-23 ENCOUNTER — OFFICE VISIT (OUTPATIENT)
Dept: FAMILY MEDICINE CLINIC | Age: 37
End: 2022-12-23
Payer: MEDICAID

## 2022-12-23 VITALS
BODY MASS INDEX: 47.09 KG/M2 | SYSTOLIC BLOOD PRESSURE: 143 MMHG | WEIGHT: 293 LBS | DIASTOLIC BLOOD PRESSURE: 87 MMHG | RESPIRATION RATE: 20 BRPM | HEART RATE: 100 BPM | TEMPERATURE: 98.4 F | HEIGHT: 66 IN | OXYGEN SATURATION: 95 %

## 2022-12-23 DIAGNOSIS — I10 ESSENTIAL HYPERTENSION WITH GOAL BLOOD PRESSURE LESS THAN 140/90: ICD-10-CM

## 2022-12-23 DIAGNOSIS — M25.561 CHRONIC PAIN OF BOTH KNEES: ICD-10-CM

## 2022-12-23 DIAGNOSIS — G89.29 CHRONIC PAIN OF BOTH KNEES: ICD-10-CM

## 2022-12-23 DIAGNOSIS — M25.562 CHRONIC PAIN OF BOTH KNEES: ICD-10-CM

## 2022-12-23 DIAGNOSIS — Z12.4 ENCOUNTER FOR SCREENING FOR MALIGNANT NEOPLASM OF CERVIX: ICD-10-CM

## 2022-12-23 DIAGNOSIS — Z01.419 ENCOUNTER FOR WELL WOMAN EXAM: Primary | ICD-10-CM

## 2022-12-23 DIAGNOSIS — E66.01 MORBID OBESITY (HCC): ICD-10-CM

## 2022-12-23 RX ORDER — DICLOFENAC SODIUM 75 MG/1
75 TABLET, DELAYED RELEASE ORAL 2 TIMES DAILY
Qty: 180 TABLET | Refills: 1 | Status: SHIPPED | OUTPATIENT
Start: 2022-12-23

## 2022-12-23 RX ORDER — LISINOPRIL 10 MG/1
10 TABLET ORAL DAILY
Qty: 90 TABLET | Refills: 3 | Status: SHIPPED | OUTPATIENT
Start: 2022-12-23

## 2022-12-23 NOTE — PROGRESS NOTES
1. Have you been to the ER, urgent care clinic since your last visit? Hospitalized since your last visit?  no    2. Have you seen or consulted any other health care providers outside of the [address] [place] [address] since your last visit?  no      3. For patients aged 39-70: Has the patient had a colonoscopy / FIT/ Cologuard? na      If the patient is female:    4. For patients aged 41-77: Has the patient had a mammogram within the past 2 years? na    5. For patients aged 21-65: Has the patient had a pap smear?  Getting today      Opportunity was given for questions    Goals that were addressed and/or need to be completed during or after this appointment include   Health Maintenance Due   Topic Date Due    Hepatitis C Screening  Never done    COVID-19 Vaccine (1) Never done    DTaP/Tdap/Td series (1 - Tdap) Never done    Cervical cancer screen  04/14/2020    Flu Vaccine (1) 08/01/2022    Depression Monitoring  09/23/2022

## 2022-12-23 NOTE — PROGRESS NOTES
[facility]    Assessment and Plan:  Diagnoses and all orders for this visit:    1. Encounter for well woman exam  -     HEPATITIS C AB; Future    2. Encounter for screening for malignant neoplasm of cervix  -     PAP IG, APTIMA HPV AND RFX 16/18,45 ([id number]); Future    3. Essential hypertension with goal blood pressure less than 140/90: Blood pressure above goal. Patient has not been taking HCTZ daily due to side effects. Interested in switching to another medication. Patient's son has been taking Lisinopril and doing well. Avoiding Amlodipine due to leg swelling. Follow up in 2-4 weeks with Dr. Mckay Shah. -     METABOLIC PANEL, COMPREHENSIVE; Future  -     lisinopriL (PRINIVIL, ZESTRIL) 10 mg tablet; Take 1 Tablet by mouth daily. 4. Morbid obesity ([address]): Discussed lifestyle changes. Interested in seeing Dr. Casie Robertson for weight loss clinic. Patient to see if morning or afternoon appointments work best and set up appointment at next visit.   -     HEMOGLOBIN A1C WITH EAG; Future  -     LIPID PANEL; Future    5. Chronic pain of both knees  -     diclofenac EC (VOLTAREN) 75 mg EC tablet; Take 1 Tablet by mouth two (2) times a day. Follow-up and Dispositions    Return in about 3 weeks (around 1/13/2023) for BP follow up w/ Dr. Mckay Shah. Subjective:  CC:   Chief Complaint   Patient presents with    Gyn Exam    Weight Loss       HPI:  Ebenezer Patricio is 42 y.o. female who presents today for pap smear and physical exam.    -Left leg swelling: Has been going on for about 1 year. Worse at end of day. -Hypertension: Does not take HCTZ daily due to frequent urination.   -Obesity: Exercise is difficult with chronic knee pain. Diet changes have been difficult due to stress with job loss about 6 months ago. Has since been able to find another job. Review of Systems   Respiratory:  Negative for shortness of breath. Cardiovascular:  Negative for chest pain.    Gastrointestinal:  Negative for abdominal pain. Genitourinary:  Negative for dysuria. Past Medical History:   Diagnosis Date    Depression 12/12/2014    Essential hypertension with goal blood pressure less than 140/90 5/17/2016    Headache(784.0)     Hx of gynecological procedure 2004    BTL    Keloid 5/17/2016     Current Outpatient Medications on File Prior to Visit   Medication Sig Dispense Refill    Blood Pressure Kit-Extra Large kit Check Blood Pressure Daily 1 Kit 0    [DISCONTINUED] hydroCHLOROthiazide (HYDRODIURIL) 25 mg tablet Take 1 Tablet by mouth daily. 90 Tablet 1    [DISCONTINUED] diclofenac EC (VOLTAREN) 75 mg EC tablet Take 1 Tablet by mouth two (2) times a day. 180 Tablet 1     No current facility-administered medications on file prior to visit. Health Maintenance Due   Topic Date Due    Hepatitis C Screening  Never done    COVID-19 Vaccine (1) Never done    DTaP/Tdap/Td series (1 - Tdap) Never done    Cervical cancer screen  04/14/2020    Flu Vaccine (1) 08/01/2022        Objective:    Vitals:    12/23/22 1058   BP: (!) 143/87   Pulse: 100   Resp: 20   Temp: 98.4 °F (36.9 °C)   TempSrc: Oral   SpO2: 95%   Weight: (!) 379 lb (171.9 kg)   Height: 5' 6\" (1.676 m)       Physical Exam  Vitals and nursing note reviewed. Exam conducted with a chaperone present. Cardiovascular:      Rate and Rhythm: Normal rate and regular rhythm. Heart sounds: Normal heart sounds. Pulmonary:      Effort: Pulmonary effort is normal.      Breath sounds: Normal breath sounds. Genitourinary:     General: Normal vulva. Vagina: Normal.      Cervix: Normal.     No results found for this or any previous visit (from the past 12 hour(s)). Patient's care was discussed with Dr. Babatunde Cronin. Mela Medina DO  Family Medicine Resident    Patient's past medical history, including but not limited to problem list, allergies, medications, social history, family history, and surgical history reviewed.  I have reviewed pertinent labs results and other data. We discussed the expected course, resolution and complications of the diagnosis(es) in detail. Medication risks, benefits, costs, interactions, and alternatives were discussed as indicated. I advised her to contact the office if her condition worsens, changes or fails to improve as anticipated. She expressed understanding with the diagnosis(es) and plan.

## 2022-12-24 LAB
ALBUMIN SERPL-MCNC: 4.1 G/DL (ref 3.8–4.8)
ALBUMIN/GLOB SERPL: 1.4 {RATIO} (ref 1.2–2.2)
ALP SERPL-CCNC: 74 IU/L (ref 44–121)
ALT SERPL-CCNC: 17 IU/L (ref 0–32)
AST SERPL-CCNC: 15 IU/L (ref 0–40)
BILIRUB SERPL-MCNC: 0.4 MG/DL (ref 0–1.2)
BUN SERPL-MCNC: 12 MG/DL (ref 6–20)
BUN/CREAT SERPL: 15 (ref 9–23)
CALCIUM SERPL-MCNC: 9.7 MG/DL (ref 8.7–10.2)
CHLORIDE SERPL-SCNC: 102 MMOL/L (ref 96–106)
CHOLEST SERPL-MCNC: 165 MG/DL (ref 100–199)
CO2 SERPL-SCNC: 24 MMOL/L (ref 20–29)
CREAT SERPL-MCNC: 0.81 MG/DL (ref 0.57–1)
EGFR: 95 ML/MIN/1.73
EST. AVERAGE GLUCOSE BLD GHB EST-MCNC: 111 MG/DL
GLOBULIN SER CALC-MCNC: 2.9 G/DL (ref 1.5–4.5)
GLUCOSE SERPL-MCNC: 100 MG/DL (ref 70–99)
HBA1C MFR BLD: 5.5 % (ref 4.8–5.6)
HCV AB S/CO SERPL IA: 0.1 S/CO RATIO (ref 0–0.9)
HDLC SERPL-MCNC: 48 MG/DL
LDLC SERPL CALC-MCNC: 102 MG/DL (ref 0–99)
POTASSIUM SERPL-SCNC: 4.3 MMOL/L (ref 3.5–5.2)
PROT SERPL-MCNC: 7 G/DL (ref 6–8.5)
SODIUM SERPL-SCNC: 140 MMOL/L (ref 134–144)
TRIGL SERPL-MCNC: 79 MG/DL (ref 0–149)
VLDLC SERPL CALC-MCNC: 15 MG/DL (ref 5–40)

## 2022-12-27 NOTE — PROGRESS NOTES
Labs reviewed:  - Hep C negative  - LDL mildly elevated 102 > lifestyle changes  - A1c normal  - CMP ok

## 2024-09-10 ENCOUNTER — OFFICE VISIT (OUTPATIENT)
Facility: CLINIC | Age: 39
End: 2024-09-10
Payer: MEDICAID

## 2024-09-10 VITALS
HEART RATE: 104 BPM | WEIGHT: 293 LBS | RESPIRATION RATE: 20 BRPM | BODY MASS INDEX: 47.09 KG/M2 | SYSTOLIC BLOOD PRESSURE: 134 MMHG | DIASTOLIC BLOOD PRESSURE: 89 MMHG | HEIGHT: 66 IN | OXYGEN SATURATION: 99 % | TEMPERATURE: 98.6 F

## 2024-09-10 DIAGNOSIS — R11.10 VOMITING IN ADULT: Primary | ICD-10-CM

## 2024-09-10 PROCEDURE — 3075F SYST BP GE 130 - 139MM HG: CPT

## 2024-09-10 PROCEDURE — 99213 OFFICE O/P EST LOW 20 MIN: CPT

## 2024-09-10 PROCEDURE — 3079F DIAST BP 80-89 MM HG: CPT

## 2024-09-10 RX ORDER — ONDANSETRON 4 MG/1
4 TABLET, ORALLY DISINTEGRATING ORAL 3 TIMES DAILY PRN
Qty: 21 TABLET | Refills: 0 | Status: SHIPPED | OUTPATIENT
Start: 2024-09-10

## 2024-09-10 SDOH — ECONOMIC STABILITY: FOOD INSECURITY: WITHIN THE PAST 12 MONTHS, THE FOOD YOU BOUGHT JUST DIDN'T LAST AND YOU DIDN'T HAVE MONEY TO GET MORE.: PATIENT DECLINED

## 2024-09-10 SDOH — ECONOMIC STABILITY: TRANSPORTATION INSECURITY
IN THE PAST 12 MONTHS, HAS LACK OF TRANSPORTATION KEPT YOU FROM MEETINGS, WORK, OR FROM GETTING THINGS NEEDED FOR DAILY LIVING?: YES

## 2024-09-10 SDOH — ECONOMIC STABILITY: FOOD INSECURITY: WITHIN THE PAST 12 MONTHS, YOU WORRIED THAT YOUR FOOD WOULD RUN OUT BEFORE YOU GOT MONEY TO BUY MORE.: PATIENT DECLINED

## 2024-09-10 SDOH — ECONOMIC STABILITY: INCOME INSECURITY: HOW HARD IS IT FOR YOU TO PAY FOR THE VERY BASICS LIKE FOOD, HOUSING, MEDICAL CARE, AND HEATING?: PATIENT DECLINED

## 2024-09-10 ASSESSMENT — PATIENT HEALTH QUESTIONNAIRE - PHQ9
SUM OF ALL RESPONSES TO PHQ QUESTIONS 1-9: 12
7. TROUBLE CONCENTRATING ON THINGS, SUCH AS READING THE NEWSPAPER OR WATCHING TELEVISION: SEVERAL DAYS
4. FEELING TIRED OR HAVING LITTLE ENERGY: MORE THAN HALF THE DAYS
5. POOR APPETITE OR OVEREATING: MORE THAN HALF THE DAYS
SUM OF ALL RESPONSES TO PHQ QUESTIONS 1-9: 12
10. IF YOU CHECKED OFF ANY PROBLEMS, HOW DIFFICULT HAVE THESE PROBLEMS MADE IT FOR YOU TO DO YOUR WORK, TAKE CARE OF THINGS AT HOME, OR GET ALONG WITH OTHER PEOPLE: NOT DIFFICULT AT ALL
SUM OF ALL RESPONSES TO PHQ9 QUESTIONS 1 & 2: 2
2. FEELING DOWN, DEPRESSED OR HOPELESS: SEVERAL DAYS
1. LITTLE INTEREST OR PLEASURE IN DOING THINGS: SEVERAL DAYS
3. TROUBLE FALLING OR STAYING ASLEEP: MORE THAN HALF THE DAYS
9. THOUGHTS THAT YOU WOULD BE BETTER OFF DEAD, OR OF HURTING YOURSELF: NOT AT ALL
SUM OF ALL RESPONSES TO PHQ QUESTIONS 1-9: 12
SUM OF ALL RESPONSES TO PHQ QUESTIONS 1-9: 12
8. MOVING OR SPEAKING SO SLOWLY THAT OTHER PEOPLE COULD HAVE NOTICED. OR THE OPPOSITE, BEING SO FIGETY OR RESTLESS THAT YOU HAVE BEEN MOVING AROUND A LOT MORE THAN USUAL: SEVERAL DAYS
6. FEELING BAD ABOUT YOURSELF - OR THAT YOU ARE A FAILURE OR HAVE LET YOURSELF OR YOUR FAMILY DOWN: MORE THAN HALF THE DAYS

## 2025-08-08 ENCOUNTER — OFFICE VISIT (OUTPATIENT)
Facility: CLINIC | Age: 40
End: 2025-08-08

## 2025-08-08 VITALS
HEART RATE: 101 BPM | WEIGHT: 293 LBS | DIASTOLIC BLOOD PRESSURE: 92 MMHG | OXYGEN SATURATION: 98 % | RESPIRATION RATE: 20 BRPM | TEMPERATURE: 98.6 F | BODY MASS INDEX: 47.09 KG/M2 | HEIGHT: 66 IN | SYSTOLIC BLOOD PRESSURE: 157 MMHG

## 2025-08-08 DIAGNOSIS — F32.5 MAJOR DEPRESSIVE DISORDER WITH SINGLE EPISODE, IN FULL REMISSION: ICD-10-CM

## 2025-08-08 DIAGNOSIS — I10 ESSENTIAL HYPERTENSION WITH GOAL BLOOD PRESSURE LESS THAN 140/90: Primary | ICD-10-CM

## 2025-08-08 DIAGNOSIS — M25.561 CHRONIC PAIN OF BOTH KNEES: ICD-10-CM

## 2025-08-08 DIAGNOSIS — R21 RASH: ICD-10-CM

## 2025-08-08 DIAGNOSIS — G89.29 CHRONIC PAIN OF BOTH KNEES: ICD-10-CM

## 2025-08-08 DIAGNOSIS — M25.562 CHRONIC PAIN OF BOTH KNEES: ICD-10-CM

## 2025-08-08 DIAGNOSIS — E66.01 MORBID OBESITY (HCC): ICD-10-CM

## 2025-08-08 DIAGNOSIS — E78.2 MIXED HYPERLIPIDEMIA: ICD-10-CM

## 2025-08-08 RX ORDER — BUPROPION HYDROCHLORIDE 150 MG/1
150 TABLET ORAL EVERY MORNING
Qty: 30 TABLET | Refills: 3 | Status: SHIPPED | OUTPATIENT
Start: 2025-08-08

## 2025-08-08 RX ORDER — TRIAMCINOLONE ACETONIDE 1 MG/G
1 CREAM TOPICAL 2 TIMES DAILY
Qty: 80 G | Refills: 1 | Status: SHIPPED | OUTPATIENT
Start: 2025-08-08

## 2025-08-08 RX ORDER — DICLOFENAC SODIUM 75 MG/1
75 TABLET, DELAYED RELEASE ORAL 2 TIMES DAILY
Qty: 180 TABLET | Refills: 1 | Status: SHIPPED | OUTPATIENT
Start: 2025-08-08

## 2025-08-08 RX ORDER — HYDROCHLOROTHIAZIDE 25 MG/1
25 TABLET ORAL EVERY MORNING
Qty: 90 TABLET | Refills: 1 | Status: SHIPPED | OUTPATIENT
Start: 2025-08-08

## 2025-08-08 SDOH — ECONOMIC STABILITY: FOOD INSECURITY: WITHIN THE PAST 12 MONTHS, THE FOOD YOU BOUGHT JUST DIDN'T LAST AND YOU DIDN'T HAVE MONEY TO GET MORE.: PATIENT DECLINED

## 2025-08-08 SDOH — ECONOMIC STABILITY: FOOD INSECURITY: WITHIN THE PAST 12 MONTHS, YOU WORRIED THAT YOUR FOOD WOULD RUN OUT BEFORE YOU GOT MONEY TO BUY MORE.: PATIENT DECLINED

## 2025-08-08 ASSESSMENT — PATIENT HEALTH QUESTIONNAIRE - PHQ9
6. FEELING BAD ABOUT YOURSELF - OR THAT YOU ARE A FAILURE OR HAVE LET YOURSELF OR YOUR FAMILY DOWN: SEVERAL DAYS
2. FEELING DOWN, DEPRESSED OR HOPELESS: SEVERAL DAYS
SUM OF ALL RESPONSES TO PHQ QUESTIONS 1-9: 10
4. FEELING TIRED OR HAVING LITTLE ENERGY: MORE THAN HALF THE DAYS
5. POOR APPETITE OR OVEREATING: MORE THAN HALF THE DAYS
SUM OF ALL RESPONSES TO PHQ QUESTIONS 1-9: 10
7. TROUBLE CONCENTRATING ON THINGS, SUCH AS READING THE NEWSPAPER OR WATCHING TELEVISION: NOT AT ALL
SUM OF ALL RESPONSES TO PHQ QUESTIONS 1-9: 10
SUM OF ALL RESPONSES TO PHQ QUESTIONS 1-9: 10
8. MOVING OR SPEAKING SO SLOWLY THAT OTHER PEOPLE COULD HAVE NOTICED. OR THE OPPOSITE, BEING SO FIGETY OR RESTLESS THAT YOU HAVE BEEN MOVING AROUND A LOT MORE THAN USUAL: NOT AT ALL
9. THOUGHTS THAT YOU WOULD BE BETTER OFF DEAD, OR OF HURTING YOURSELF: NOT AT ALL
10. IF YOU CHECKED OFF ANY PROBLEMS, HOW DIFFICULT HAVE THESE PROBLEMS MADE IT FOR YOU TO DO YOUR WORK, TAKE CARE OF THINGS AT HOME, OR GET ALONG WITH OTHER PEOPLE: NOT DIFFICULT AT ALL
3. TROUBLE FALLING OR STAYING ASLEEP: MORE THAN HALF THE DAYS
1. LITTLE INTEREST OR PLEASURE IN DOING THINGS: MORE THAN HALF THE DAYS

## 2025-08-08 ASSESSMENT — ENCOUNTER SYMPTOMS
APNEA: 0
CHEST TIGHTNESS: 0
COLOR CHANGE: 0